# Patient Record
Sex: FEMALE | Race: BLACK OR AFRICAN AMERICAN | NOT HISPANIC OR LATINO | Employment: OTHER | URBAN - METROPOLITAN AREA
[De-identification: names, ages, dates, MRNs, and addresses within clinical notes are randomized per-mention and may not be internally consistent; named-entity substitution may affect disease eponyms.]

---

## 2023-03-02 PROBLEM — R59.0 CERVICAL LYMPHADENOPATHY: Status: ACTIVE | Noted: 2023-03-02

## 2023-03-02 PROBLEM — H93.13 SUBJECTIVE TINNITUS, BILATERAL: Status: ACTIVE | Noted: 2023-03-02

## 2023-03-02 PROBLEM — M85.80 OSTEOPENIA: Status: ACTIVE | Noted: 2023-03-02

## 2023-03-02 PROBLEM — H81.12 BENIGN PAROXYSMAL POSITIONAL VERTIGO OF LEFT EAR: Status: ACTIVE | Noted: 2023-03-02

## 2023-03-02 PROBLEM — M54.17 LUMBOSACRAL NEURITIS: Status: ACTIVE | Noted: 2023-03-02

## 2023-03-02 PROBLEM — R93.1 ELEVATED CORONARY ARTERY CALCIUM SCORE: Status: ACTIVE | Noted: 2023-03-02

## 2023-03-02 PROBLEM — E78.5 DYSLIPIDEMIA: Status: ACTIVE | Noted: 2023-03-02

## 2023-03-02 PROBLEM — M10.9 GOUT: Status: ACTIVE | Noted: 2023-03-02

## 2023-03-02 PROBLEM — D49.2 ATYPICAL SQUAMOPROLIFERATIVE SKIN LESION: Status: ACTIVE | Noted: 2023-03-02

## 2023-03-02 PROBLEM — I10 HYPERTENSION: Status: ACTIVE | Noted: 2023-03-02

## 2023-03-02 PROBLEM — N76.0 VAGINITIS AND VULVOVAGINITIS: Status: ACTIVE | Noted: 2023-03-02

## 2023-03-02 PROBLEM — N90.89 VULVAR SKIN TAG: Status: ACTIVE | Noted: 2023-03-02

## 2023-03-02 PROBLEM — M19.019 PRIMARY OSTEOARTHRITIS OF SHOULDER: Status: ACTIVE | Noted: 2023-03-02

## 2023-03-02 PROBLEM — R42 VERTIGO: Status: ACTIVE | Noted: 2023-03-02

## 2023-03-02 PROBLEM — D72.819 LEUKOPENIA: Status: ACTIVE | Noted: 2023-03-02

## 2023-03-02 PROBLEM — R17 ELEVATED BILIRUBIN: Status: ACTIVE | Noted: 2023-03-02

## 2023-03-02 PROBLEM — N95.2 POSTMENOPAUSAL ATROPHIC VAGINITIS: Status: ACTIVE | Noted: 2023-03-02

## 2023-03-02 PROBLEM — I35.1 MODERATE AORTIC INSUFFICIENCY: Status: ACTIVE | Noted: 2023-03-02

## 2023-03-02 RX ORDER — SIMVASTATIN 10 MG/1
10 TABLET, FILM COATED ORAL NIGHTLY
COMMUNITY
Start: 2015-04-28 | End: 2024-01-25 | Stop reason: SDUPTHER

## 2023-03-02 RX ORDER — ESTRADIOL 0.1 MG/G
CREAM VAGINAL
COMMUNITY
Start: 2022-03-21 | End: 2024-02-07 | Stop reason: SINTOL

## 2023-03-02 RX ORDER — ALENDRONATE SODIUM 70 MG/1
70 TABLET ORAL
COMMUNITY
Start: 2020-07-21 | End: 2024-02-07 | Stop reason: SINTOL

## 2023-03-02 RX ORDER — NYSTATIN 100000 U/G
CREAM TOPICAL
COMMUNITY
Start: 2022-03-13 | End: 2024-04-04 | Stop reason: WASHOUT

## 2023-03-02 RX ORDER — OMEPRAZOLE 20 MG/1
20 CAPSULE, DELAYED RELEASE ORAL EVERY MORNING
COMMUNITY
Start: 2017-04-03 | End: 2023-06-26 | Stop reason: SDUPTHER

## 2023-03-02 RX ORDER — AMLODIPINE BESYLATE 5 MG/1
1 TABLET ORAL DAILY
COMMUNITY
Start: 2019-04-18 | End: 2023-10-09 | Stop reason: SDUPTHER

## 2023-03-02 RX ORDER — BETAMETHASONE VALERATE 1 MG/G
CREAM TOPICAL
COMMUNITY
Start: 2022-05-31

## 2023-03-02 RX ORDER — LOSARTAN POTASSIUM 100 MG/1
1 TABLET ORAL DAILY
COMMUNITY
Start: 2014-02-14 | End: 2023-06-26 | Stop reason: SDUPTHER

## 2023-03-09 ENCOUNTER — OFFICE VISIT (OUTPATIENT)
Dept: PRIMARY CARE | Facility: CLINIC | Age: 83
End: 2023-03-09
Payer: MEDICARE

## 2023-03-09 VITALS
HEART RATE: 90 BPM | BODY MASS INDEX: 22.72 KG/M2 | DIASTOLIC BLOOD PRESSURE: 79 MMHG | SYSTOLIC BLOOD PRESSURE: 150 MMHG | WEIGHT: 105 LBS

## 2023-03-09 DIAGNOSIS — R10.13 EPIGASTRIC ABDOMINAL PAIN: ICD-10-CM

## 2023-03-09 DIAGNOSIS — I10 PRIMARY HYPERTENSION: ICD-10-CM

## 2023-03-09 DIAGNOSIS — M19.90 OSTEOARTHRITIS, UNSPECIFIED OSTEOARTHRITIS TYPE, UNSPECIFIED SITE: Primary | ICD-10-CM

## 2023-03-09 PROCEDURE — 1159F MED LIST DOCD IN RCRD: CPT | Performed by: INTERNAL MEDICINE

## 2023-03-09 PROCEDURE — 1160F RVW MEDS BY RX/DR IN RCRD: CPT | Performed by: INTERNAL MEDICINE

## 2023-03-09 PROCEDURE — 99214 OFFICE O/P EST MOD 30 MIN: CPT | Performed by: INTERNAL MEDICINE

## 2023-03-09 PROCEDURE — 3078F DIAST BP <80 MM HG: CPT | Performed by: INTERNAL MEDICINE

## 2023-03-09 PROCEDURE — 3077F SYST BP >= 140 MM HG: CPT | Performed by: INTERNAL MEDICINE

## 2023-03-09 ASSESSMENT — ENCOUNTER SYMPTOMS
APPETITE CHANGE: 0
WHEEZING: 0
UNEXPECTED WEIGHT CHANGE: 0
CONSTIPATION: 0
SINUS PRESSURE: 0
COUGH: 0
PALPITATIONS: 0
CHEST TIGHTNESS: 0
DIARRHEA: 0
FEVER: 0
NAUSEA: 0
CONFUSION: 0
SORE THROAT: 0
VOMITING: 0
SLEEP DISTURBANCE: 0
SHORTNESS OF BREATH: 0
ABDOMINAL PAIN: 1

## 2023-03-09 NOTE — PROGRESS NOTES
Desiree Cochran comes in today for an ER follow up.    Ms. Cochran comes in today for an ER follow-up.  About 2 weeks ago, she was traveling, ate some fried fish, and the following morning woke up with epigastric pain, palpitations, and a headache.  She returned home, then went to the emergency room.  Any cardiac event was ruled out.  She has felt essentially back to her baseline.  She still has occasional symptoms intermittently of reflux, she does use Mylanta for this.  This is rare.  Her appetite is essentially back to normal.  Blood pressure at home has been excellent, typically in the 120's/70's.  She feels well, but wanted to check in with us as a follow up.  She will be returning for her routine wellness visit in the late spring.      ER Follow-up  Associated symptoms include abdominal pain. Pertinent negatives include no chest pain, coughing, fever, nausea, sore throat or vomiting.       Review of Systems   Constitutional:  Negative for appetite change, fever and unexpected weight change.   HENT:  Negative for sinus pressure and sore throat.    Respiratory:  Negative for cough, chest tightness, shortness of breath and wheezing.    Cardiovascular:  Negative for chest pain, palpitations and leg swelling.   Gastrointestinal:  Positive for abdominal pain. Negative for constipation, diarrhea, nausea and vomiting.   Psychiatric/Behavioral:  Negative for confusion and sleep disturbance.        Objective   Physical Exam  Constitutional:       Appearance: Normal appearance.   Cardiovascular:      Rate and Rhythm: Normal rate and regular rhythm.      Heart sounds: No murmur heard.  Pulmonary:      Effort: Pulmonary effort is normal. No respiratory distress.      Breath sounds: Normal breath sounds. No rhonchi or rales.   Abdominal:      General: Abdomen is flat. Bowel sounds are normal. There is no distension.      Tenderness: There is no abdominal tenderness. There is no guarding.   Neurological:      Mental Status: She  is alert.         Assessment/Plan   1.  Epigastric discomfort: Has been managing well, symptoms fully resolved.  No change in medications.  Encouraged to continue on Prilosec daily.  Contact us if refills needed  2.  Hypertension: Well managed at home.  Tends to be elevated here in office, but running in excellent ranges at home.  Contact us if refills needed.  3.  Osteoarthritis: Requesting disability placard prescription.  Provided.    Happy to see her back in the spring for her wellness visit.  She is to contact us with any questions.  Problem List Items Addressed This Visit    None  Visit Diagnoses       Osteoarthritis, unspecified osteoarthritis type, unspecified site    -  Primary    Relevant Orders    Disability Placard

## 2023-03-09 NOTE — PATIENT INSTRUCTIONS
I am happy to hear that your symptoms have resolved.  I would recommend continuing on Prilosec daily, taken 30 minutes prior to breakfast.  If you have any recurrence of symptoms or worsening symptoms, please contact us.  Otherwise, we are happy to see you back in the spring for your wellness visit.  Please call with any questions or concerns.

## 2023-03-31 ENCOUNTER — TELEPHONE (OUTPATIENT)
Dept: PRIMARY CARE | Facility: CLINIC | Age: 83
End: 2023-03-31

## 2023-04-12 ENCOUNTER — OFFICE VISIT (OUTPATIENT)
Dept: PRIMARY CARE | Facility: CLINIC | Age: 83
End: 2023-04-12
Payer: MEDICARE

## 2023-04-12 VITALS
WEIGHT: 105.4 LBS | SYSTOLIC BLOOD PRESSURE: 126 MMHG | BODY MASS INDEX: 22.81 KG/M2 | HEART RATE: 62 BPM | DIASTOLIC BLOOD PRESSURE: 72 MMHG

## 2023-04-12 DIAGNOSIS — E78.5 DYSLIPIDEMIA: ICD-10-CM

## 2023-04-12 DIAGNOSIS — M85.80 OSTEOPENIA, UNSPECIFIED LOCATION: ICD-10-CM

## 2023-04-12 DIAGNOSIS — F43.21 GRIEF REACTION: ICD-10-CM

## 2023-04-12 DIAGNOSIS — R10.13 EPIGASTRIC ABDOMINAL PAIN: Primary | ICD-10-CM

## 2023-04-12 DIAGNOSIS — R19.7 DIARRHEA, UNSPECIFIED TYPE: ICD-10-CM

## 2023-04-12 PROCEDURE — 99214 OFFICE O/P EST MOD 30 MIN: CPT | Performed by: INTERNAL MEDICINE

## 2023-04-12 PROCEDURE — 3074F SYST BP LT 130 MM HG: CPT | Performed by: INTERNAL MEDICINE

## 2023-04-12 PROCEDURE — 3078F DIAST BP <80 MM HG: CPT | Performed by: INTERNAL MEDICINE

## 2023-04-12 PROCEDURE — 1159F MED LIST DOCD IN RCRD: CPT | Performed by: INTERNAL MEDICINE

## 2023-04-12 PROCEDURE — 1160F RVW MEDS BY RX/DR IN RCRD: CPT | Performed by: INTERNAL MEDICINE

## 2023-04-12 PROCEDURE — 1036F TOBACCO NON-USER: CPT | Performed by: INTERNAL MEDICINE

## 2023-04-12 RX ORDER — LORAZEPAM 0.5 MG/1
TABLET ORAL
COMMUNITY
Start: 2023-03-30

## 2023-04-12 RX ORDER — METOPROLOL TARTRATE 25 MG/1
0.5 TABLET, FILM COATED ORAL 2 TIMES DAILY
COMMUNITY
Start: 2022-09-02 | End: 2023-08-01 | Stop reason: SINTOL

## 2023-04-12 RX ORDER — CLOBETASOL PROPIONATE 0.5 MG/G
OINTMENT TOPICAL
COMMUNITY
Start: 2022-09-20 | End: 2024-02-07 | Stop reason: SDUPTHER

## 2023-04-12 ASSESSMENT — ENCOUNTER SYMPTOMS
DIARRHEA: 1
NAUSEA: 0
APPETITE CHANGE: 0
VOMITING: 0
COUGH: 0
ABDOMINAL PAIN: 0
DIZZINESS: 0
CHEST TIGHTNESS: 0
HEADACHES: 0
CONSTIPATION: 0
WHEEZING: 0
ABDOMINAL DISTENTION: 0
SHORTNESS OF BREATH: 0
ACTIVITY CHANGE: 0
RECTAL PAIN: 0
FATIGUE: 0
BLOOD IN STOOL: 0
WEAKNESS: 0
UNEXPECTED WEIGHT CHANGE: 1
FEVER: 0

## 2023-04-12 NOTE — PROGRESS NOTES
Desiree Cochran comes in today for an ER follow up.      Ms. Cochran returns today with another ER visit.  She went back for diarrhea.  She was having epigastric discomfort again.  She continues on PPI therapy.  Lab work was all reassuring.  CT showed a mild gastritis, but otherwise reassuring.  She was asked to schedule an EGD.  She admits that some of her symptoms may be related to stress as she has been grieving her sister's death recently.  She has started on Mylanta and Imodium and actually feels significantly better.  She has dropped some weight overall, however, but really on comparison with her chart, this has been only about 5 pounds in the past year.  She states that her appetite is normal, but she is afraid to eat because of the diarrhea.  She did stop her alendronate as well and feels that this is improving her symptoms somewhat.  She also stopped her statin in case this was contributing.  The longer out that she is from her sister's passing, and the more she is adjusting to her grief, her symptoms seem to be improving.  She is not particularly interested in invasive procedures, but is comfortable at least proceeding with that referral.  There is no abdominal pain at this time and the diarrhea is improving, with more formed stools.  She has had no nausea nor vomiting.        Review of Systems   Constitutional:  Positive for unexpected weight change (Slight weight drop, about 5 lbs over past year). Negative for activity change, appetite change, fatigue and fever.   Respiratory:  Negative for cough, chest tightness, shortness of breath and wheezing.    Cardiovascular:  Negative for chest pain.   Gastrointestinal:  Positive for diarrhea. Negative for abdominal distention, abdominal pain, blood in stool, constipation, nausea, rectal pain and vomiting.   Neurological:  Negative for dizziness, syncope, weakness and headaches.       Objective   Physical Exam  Cardiovascular:      Rate and Rhythm: Normal rate and  regular rhythm.   Abdominal:      General: Abdomen is flat. Bowel sounds are normal. There is no distension.      Palpations: Abdomen is soft.      Tenderness: There is no abdominal tenderness. There is no guarding or rebound.      Hernia: No hernia is present.   Neurological:      Mental Status: She is alert.         Assessment/Plan   1.  Epigastric pain with history of PUD: Continues on PPI therapy.  Lab work really looked quite reassuring with recent ER visit.  Have discussed endoscopy versus GI referral.  We will start with GI referral especially as symptoms starting to improve and lab work was reassuring.  However, if she is not able to get in with them for some time, we would proceed with endoscopy.  She is to contact us with an update especially if symptoms take a turn for the worse.  2.  Grief reaction: The anxiety and grief she admits likely is contributing significantly.  She is trying to manage this well and is talking with family, starting to get a bit more comfortable.  3.  Osteopenia: She has stopped her alendronate therapy, and feels as though this was exacerbating her symptoms.  Reasonable to remain off of this.  4.  Hyperlipidemia: Again, has stopped statin.  Although not ideal, reasonable until symptoms improve.    If she has any recurrence or worsening of symptoms again, she is to contact us promptly.  Problem List Items Addressed This Visit    None

## 2023-04-12 NOTE — PATIENT INSTRUCTIONS
A referral has been placed for a gastroenterologist.  If it does take quite some time to get in for this appointment, we could consider directly proceeding with an endoscopy.  Please keep us updated on when you have this scheduled.  If you have any acute worsening of symptoms, please contact us immediately.

## 2023-06-26 DIAGNOSIS — I10 PRIMARY HYPERTENSION: ICD-10-CM

## 2023-06-26 DIAGNOSIS — R10.13 EPIGASTRIC ABDOMINAL PAIN: ICD-10-CM

## 2023-06-26 RX ORDER — OMEPRAZOLE 20 MG/1
20 CAPSULE, DELAYED RELEASE ORAL EVERY MORNING
Qty: 90 CAPSULE | Refills: 1 | Status: SHIPPED | OUTPATIENT
Start: 2023-06-26 | End: 2024-02-14 | Stop reason: SDUPTHER

## 2023-06-26 RX ORDER — LOSARTAN POTASSIUM 100 MG/1
100 TABLET ORAL DAILY
Qty: 90 TABLET | Refills: 1 | Status: SHIPPED | OUTPATIENT
Start: 2023-06-26 | End: 2024-02-14 | Stop reason: SDUPTHER

## 2023-08-01 ENCOUNTER — APPOINTMENT (OUTPATIENT)
Dept: LAB | Facility: LAB | Age: 83
End: 2023-08-01
Payer: MEDICARE

## 2023-08-01 ENCOUNTER — OFFICE VISIT (OUTPATIENT)
Dept: PRIMARY CARE | Facility: CLINIC | Age: 83
End: 2023-08-01
Payer: MEDICARE

## 2023-08-01 ENCOUNTER — LAB (OUTPATIENT)
Dept: LAB | Facility: LAB | Age: 83
End: 2023-08-01
Payer: MEDICARE

## 2023-08-01 VITALS
HEIGHT: 57 IN | HEART RATE: 80 BPM | BODY MASS INDEX: 22.18 KG/M2 | SYSTOLIC BLOOD PRESSURE: 149 MMHG | WEIGHT: 102.8 LBS | DIASTOLIC BLOOD PRESSURE: 77 MMHG

## 2023-08-01 DIAGNOSIS — D72.819 LEUKOPENIA, UNSPECIFIED TYPE: ICD-10-CM

## 2023-08-01 DIAGNOSIS — I10 PRIMARY HYPERTENSION: ICD-10-CM

## 2023-08-01 DIAGNOSIS — Z12.31 ENCOUNTER FOR SCREENING MAMMOGRAM FOR MALIGNANT NEOPLASM OF BREAST: ICD-10-CM

## 2023-08-01 DIAGNOSIS — E78.5 DYSLIPIDEMIA: ICD-10-CM

## 2023-08-01 DIAGNOSIS — M54.17 LUMBOSACRAL NEURITIS: ICD-10-CM

## 2023-08-01 DIAGNOSIS — M10.9 GOUT, UNSPECIFIED CAUSE, UNSPECIFIED CHRONICITY, UNSPECIFIED SITE: ICD-10-CM

## 2023-08-01 DIAGNOSIS — M85.80 OSTEOPENIA, UNSPECIFIED LOCATION: ICD-10-CM

## 2023-08-01 DIAGNOSIS — H91.90 HEARING LOSS, UNSPECIFIED HEARING LOSS TYPE, UNSPECIFIED LATERALITY: ICD-10-CM

## 2023-08-01 DIAGNOSIS — Z00.00 ROUTINE GENERAL MEDICAL EXAMINATION AT HEALTH CARE FACILITY: Primary | ICD-10-CM

## 2023-08-01 DIAGNOSIS — H93.13 SUBJECTIVE TINNITUS, BILATERAL: ICD-10-CM

## 2023-08-01 LAB
ALANINE AMINOTRANSFERASE (SGPT) (U/L) IN SER/PLAS: 20 U/L (ref 7–45)
ALBUMIN (G/DL) IN SER/PLAS: 4.7 G/DL (ref 3.4–5)
ALBUMIN (MG/L) IN URINE: <7 MG/L
ALBUMIN/CREATININE (UG/MG) IN URINE: NORMAL UG/MG CRT (ref 0–30)
ALKALINE PHOSPHATASE (U/L) IN SER/PLAS: 84 U/L (ref 33–136)
ANION GAP IN SER/PLAS: 12 MMOL/L (ref 10–20)
APPEARANCE, URINE: CLEAR
ASPARTATE AMINOTRANSFERASE (SGOT) (U/L) IN SER/PLAS: 24 U/L (ref 9–39)
BASOPHILS (10*3/UL) IN BLOOD BY AUTOMATED COUNT: 0.02 X10E9/L (ref 0–0.1)
BASOPHILS/100 LEUKOCYTES IN BLOOD BY AUTOMATED COUNT: 0.5 % (ref 0–2)
BILIRUBIN TOTAL (MG/DL) IN SER/PLAS: 1.1 MG/DL (ref 0–1.2)
BILIRUBIN, URINE: NEGATIVE
BLOOD, URINE: NEGATIVE
CALCIDIOL (25 OH VITAMIN D3) (NG/ML) IN SER/PLAS: 50 NG/ML
CALCIUM (MG/DL) IN SER/PLAS: 9.9 MG/DL (ref 8.6–10.6)
CARBON DIOXIDE, TOTAL (MMOL/L) IN SER/PLAS: 28 MMOL/L (ref 21–32)
CHLORIDE (MMOL/L) IN SER/PLAS: 100 MMOL/L (ref 98–107)
CHOLESTEROL (MG/DL) IN SER/PLAS: 206 MG/DL (ref 0–199)
CHOLESTEROL IN HDL (MG/DL) IN SER/PLAS: 65.3 MG/DL
CHOLESTEROL/HDL RATIO: 3.2
COBALAMIN (VITAMIN B12) (PG/ML) IN SER/PLAS: 1018 PG/ML (ref 211–911)
COLOR, URINE: NORMAL
CREATINE KINASE (U/L) IN SER/PLAS: 81 U/L (ref 0–215)
CREATININE (MG/DL) IN SER/PLAS: 1.01 MG/DL (ref 0.5–1.05)
CREATININE (MG/DL) IN URINE: 36.9 MG/DL (ref 20–320)
EOSINOPHILS (10*3/UL) IN BLOOD BY AUTOMATED COUNT: 0.04 X10E9/L (ref 0–0.4)
EOSINOPHILS/100 LEUKOCYTES IN BLOOD BY AUTOMATED COUNT: 1.1 % (ref 0–6)
ERYTHROCYTE DISTRIBUTION WIDTH (RATIO) BY AUTOMATED COUNT: 13.5 % (ref 11.5–14.5)
ERYTHROCYTE MEAN CORPUSCULAR HEMOGLOBIN CONCENTRATION (G/DL) BY AUTOMATED: 33.4 G/DL (ref 32–36)
ERYTHROCYTE MEAN CORPUSCULAR VOLUME (FL) BY AUTOMATED COUNT: 92 FL (ref 80–100)
ERYTHROCYTES (10*6/UL) IN BLOOD BY AUTOMATED COUNT: 4.47 X10E12/L (ref 4–5.2)
GFR FEMALE: 56 ML/MIN/1.73M2
GLUCOSE (MG/DL) IN SER/PLAS: 89 MG/DL (ref 74–99)
GLUCOSE, URINE: NEGATIVE MG/DL
HEMATOCRIT (%) IN BLOOD BY AUTOMATED COUNT: 41.3 % (ref 36–46)
HEMOGLOBIN (G/DL) IN BLOOD: 13.8 G/DL (ref 12–16)
IMMATURE GRANULOCYTES/100 LEUKOCYTES IN BLOOD BY AUTOMATED COUNT: 0.3 % (ref 0–0.9)
IRON (UG/DL) IN SER/PLAS: 123 UG/DL (ref 35–150)
IRON BINDING CAPACITY (UG/DL) IN SER/PLAS: 374 UG/DL (ref 240–445)
IRON SATURATION (%) IN SER/PLAS: 33 % (ref 25–45)
KETONES, URINE: NEGATIVE MG/DL
LDL: 125 MG/DL (ref 0–99)
LEUKOCYTE ESTERASE, URINE: NEGATIVE
LEUKOCYTES (10*3/UL) IN BLOOD BY AUTOMATED COUNT: 3.7 X10E9/L (ref 4.4–11.3)
LYMPHOCYTES (10*3/UL) IN BLOOD BY AUTOMATED COUNT: 0.87 X10E9/L (ref 0.8–3)
LYMPHOCYTES/100 LEUKOCYTES IN BLOOD BY AUTOMATED COUNT: 23.7 % (ref 13–44)
MONOCYTES (10*3/UL) IN BLOOD BY AUTOMATED COUNT: 0.34 X10E9/L (ref 0.05–0.8)
MONOCYTES/100 LEUKOCYTES IN BLOOD BY AUTOMATED COUNT: 9.3 % (ref 2–10)
NEUTROPHILS (10*3/UL) IN BLOOD BY AUTOMATED COUNT: 2.39 X10E9/L (ref 1.6–5.5)
NEUTROPHILS/100 LEUKOCYTES IN BLOOD BY AUTOMATED COUNT: 65.1 % (ref 40–80)
NITRITE, URINE: NEGATIVE
NRBC (PER 100 WBCS) BY AUTOMATED COUNT: 0 /100 WBC (ref 0–0)
PH, URINE: 7 (ref 5–8)
PLATELETS (10*3/UL) IN BLOOD AUTOMATED COUNT: 266 X10E9/L (ref 150–450)
POTASSIUM (MMOL/L) IN SER/PLAS: 4.3 MMOL/L (ref 3.5–5.3)
PROTEIN TOTAL: 7.6 G/DL (ref 6.4–8.2)
PROTEIN, URINE: NEGATIVE MG/DL
SODIUM (MMOL/L) IN SER/PLAS: 136 MMOL/L (ref 136–145)
SPECIFIC GRAVITY, URINE: 1.01 (ref 1–1.03)
THYROTROPIN (MIU/L) IN SER/PLAS BY DETECTION LIMIT <= 0.05 MIU/L: 1.41 MIU/L (ref 0.44–3.98)
TRIGLYCERIDE (MG/DL) IN SER/PLAS: 77 MG/DL (ref 0–149)
URATE (MG/DL) IN SER/PLAS: 4.6 MG/DL (ref 2.3–6.7)
UREA NITROGEN (MG/DL) IN SER/PLAS: 22 MG/DL (ref 6–23)
UROBILINOGEN, URINE: <2 MG/DL (ref 0–1.9)
VLDL: 15 MG/DL (ref 0–40)

## 2023-08-01 PROCEDURE — 80053 COMPREHEN METABOLIC PANEL: CPT

## 2023-08-01 PROCEDURE — 80061 LIPID PANEL: CPT

## 2023-08-01 PROCEDURE — 3077F SYST BP >= 140 MM HG: CPT | Performed by: INTERNAL MEDICINE

## 2023-08-01 PROCEDURE — 36415 COLL VENOUS BLD VENIPUNCTURE: CPT

## 2023-08-01 PROCEDURE — 82306 VITAMIN D 25 HYDROXY: CPT

## 2023-08-01 PROCEDURE — 82570 ASSAY OF URINE CREATININE: CPT

## 2023-08-01 PROCEDURE — 83550 IRON BINDING TEST: CPT

## 2023-08-01 PROCEDURE — 82607 VITAMIN B-12: CPT

## 2023-08-01 PROCEDURE — 1036F TOBACCO NON-USER: CPT | Performed by: INTERNAL MEDICINE

## 2023-08-01 PROCEDURE — 84443 ASSAY THYROID STIM HORMONE: CPT

## 2023-08-01 PROCEDURE — 99213 OFFICE O/P EST LOW 20 MIN: CPT | Performed by: INTERNAL MEDICINE

## 2023-08-01 PROCEDURE — 85025 COMPLETE CBC W/AUTO DIFF WBC: CPT

## 2023-08-01 PROCEDURE — 1159F MED LIST DOCD IN RCRD: CPT | Performed by: INTERNAL MEDICINE

## 2023-08-01 PROCEDURE — 82043 UR ALBUMIN QUANTITATIVE: CPT

## 2023-08-01 PROCEDURE — 84550 ASSAY OF BLOOD/URIC ACID: CPT

## 2023-08-01 PROCEDURE — 83540 ASSAY OF IRON: CPT

## 2023-08-01 PROCEDURE — G0439 PPPS, SUBSEQ VISIT: HCPCS | Performed by: INTERNAL MEDICINE

## 2023-08-01 PROCEDURE — 3078F DIAST BP <80 MM HG: CPT | Performed by: INTERNAL MEDICINE

## 2023-08-01 PROCEDURE — 81003 URINALYSIS AUTO W/O SCOPE: CPT

## 2023-08-01 PROCEDURE — 1170F FXNL STATUS ASSESSED: CPT | Performed by: INTERNAL MEDICINE

## 2023-08-01 PROCEDURE — 82550 ASSAY OF CK (CPK): CPT

## 2023-08-01 PROCEDURE — 93000 ELECTROCARDIOGRAM COMPLETE: CPT | Performed by: INTERNAL MEDICINE

## 2023-08-01 PROCEDURE — 1125F AMNT PAIN NOTED PAIN PRSNT: CPT | Performed by: INTERNAL MEDICINE

## 2023-08-01 PROCEDURE — 1160F RVW MEDS BY RX/DR IN RCRD: CPT | Performed by: INTERNAL MEDICINE

## 2023-08-01 PROCEDURE — 99397 PER PM REEVAL EST PAT 65+ YR: CPT | Performed by: INTERNAL MEDICINE

## 2023-08-01 ASSESSMENT — ENCOUNTER SYMPTOMS
UNEXPECTED WEIGHT CHANGE: 0
FLANK PAIN: 0
DEPRESSION: 0
SORE THROAT: 0
DIZZINESS: 0
COLOR CHANGE: 0
WEAKNESS: 0
SHORTNESS OF BREATH: 0
TROUBLE SWALLOWING: 0
APPETITE CHANGE: 0
FATIGUE: 0
HEMATURIA: 0
NECK STIFFNESS: 1
NAUSEA: 0
SINUS PAIN: 0
VOMITING: 0
HEADACHES: 0
CHEST TIGHTNESS: 0
WHEEZING: 0
ACTIVITY CHANGE: 0
RHINORRHEA: 0
DIARRHEA: 0
MYALGIAS: 1
ARTHRALGIAS: 1
SINUS PRESSURE: 0
LIGHT-HEADEDNESS: 0
COUGH: 0
PALPITATIONS: 0
ABDOMINAL DISTENTION: 0
ABDOMINAL PAIN: 0
ADENOPATHY: 0
VOICE CHANGE: 0
DYSPHORIC MOOD: 0
OCCASIONAL FEELINGS OF UNSTEADINESS: 0
BACK PAIN: 1
BRUISES/BLEEDS EASILY: 0
FEVER: 0
FREQUENCY: 1
NERVOUS/ANXIOUS: 0
DYSURIA: 0
SLEEP DISTURBANCE: 0
EYE ITCHING: 0
LOSS OF SENSATION IN FEET: 0
CONSTIPATION: 0
DECREASED CONCENTRATION: 0

## 2023-08-01 ASSESSMENT — ACTIVITIES OF DAILY LIVING (ADL)
TAKING_MEDICATION: INDEPENDENT
MANAGING_FINANCES: INDEPENDENT
DOING_HOUSEWORK: INDEPENDENT
GROCERY_SHOPPING: INDEPENDENT
DRESSING: INDEPENDENT
BATHING: INDEPENDENT

## 2023-08-01 ASSESSMENT — PATIENT HEALTH QUESTIONNAIRE - PHQ9
1. LITTLE INTEREST OR PLEASURE IN DOING THINGS: NOT AT ALL
2. FEELING DOWN, DEPRESSED OR HOPELESS: NOT AT ALL
SUM OF ALL RESPONSES TO PHQ9 QUESTIONS 1 AND 2: 0

## 2023-08-01 NOTE — PROGRESS NOTES
Subjective   Reason for Visit: Desiree Cochran is an 82 y.o. female patient comes in today for comprehensive physical and follow-up of medical conditions in conjunction with annual wellness visit    Ms. Cochran comes in today for comprehensive physical and follow-up of medical conditions in conjunction with annual wellness visit, dictated in a separate note.  Please refer to that note for details on healthcare maintenance and screening studies.    Ms. Cochran comes in today for comprehensive physical as well as a follow-up of medical conditions.  Her abdominal symptoms have essentially resolved and she is feeling better.  Her weight has stabilized.  She is trying to eat a bland diet and finds that this does quite well for her.  She denies any headaches, chest pain or palpitations.  She has noticed a decrease in hearing on her left.  She has been battling some right-sided sciatic pain and would like physical therapy.  She continues on her amlodipine and losartan, tolerating these well.  Blood pressures at home are consistently in the 110s/60s.  She has been having some urinary frequency and would like to have this evaluated.  She otherwise feels reasonably well.  She is amenable to having some updated blood work.        Patient Care Team:  Betsy Schneider MD as PCP - General  Betsy Schneider MD as PCP - [employer] PCP     Review of Systems   Constitutional:  Negative for activity change, appetite change, fatigue, fever and unexpected weight change.   HENT:  Positive for hearing loss. Negative for congestion, ear pain, postnasal drip, rhinorrhea, sinus pressure, sinus pain, sore throat, trouble swallowing and voice change.    Eyes:  Negative for itching and visual disturbance.   Respiratory:  Negative for cough, chest tightness, shortness of breath and wheezing.    Cardiovascular:  Negative for chest pain, palpitations and leg swelling.   Gastrointestinal:  Negative for abdominal distention, abdominal pain,  constipation, diarrhea, nausea and vomiting.   Endocrine: Negative for cold intolerance and polyuria.   Genitourinary:  Positive for frequency. Negative for dysuria, flank pain, hematuria, urgency and vaginal discharge.   Musculoskeletal:  Positive for arthralgias, back pain, myalgias and neck stiffness.   Skin:  Negative for color change, pallor and rash.   Allergic/Immunologic: Negative for environmental allergies, food allergies and immunocompromised state.   Neurological:  Negative for dizziness, syncope, weakness, light-headedness and headaches.   Hematological:  Negative for adenopathy. Does not bruise/bleed easily.   Psychiatric/Behavioral:  Negative for behavioral problems, decreased concentration, dysphoric mood and sleep disturbance. The patient is not nervous/anxious.        Objective   Vitals:  There were no vitals taken for this visit.      Physical Exam  Constitutional:       General: She is not in acute distress.     Appearance: Normal appearance. She is well-developed. She is not ill-appearing.   HENT:      Head: Normocephalic.      Right Ear: Ear canal and external ear normal. A middle ear effusion is present.      Left Ear: Ear canal and external ear normal. A middle ear effusion is present.      Nose: Nose normal.      Mouth/Throat:      Mouth: Mucous membranes are moist.      Pharynx: Oropharynx is clear. No oropharyngeal exudate or posterior oropharyngeal erythema.   Eyes:      General: Lids are normal. No scleral icterus.     Extraocular Movements: Extraocular movements intact.      Conjunctiva/sclera: Conjunctivae normal.      Pupils: Pupils are equal, round, and reactive to light.   Neck:      Vascular: No carotid bruit.   Cardiovascular:      Rate and Rhythm: Normal rate and regular rhythm.      Pulses: Normal pulses.      Heart sounds: No murmur heard.  Pulmonary:      Effort: Pulmonary effort is normal. No respiratory distress.      Breath sounds: No wheezing, rhonchi or rales.   Chest:       Comments: Significant fibrocystic changes bilaterally  Abdominal:      General: Bowel sounds are normal. There is no distension.      Palpations: Abdomen is soft. There is no mass.      Tenderness: There is no abdominal tenderness. There is no guarding.   Genitourinary:     Comments: Deferred as status post LUZ/BSO  Musculoskeletal:      Cervical back: Normal range of motion and neck supple. No tenderness.      Right lower leg: No edema.      Left lower leg: No edema.   Lymphadenopathy:      Cervical: No cervical adenopathy.      Upper Body:      Right upper body: No supraclavicular or axillary adenopathy.      Left upper body: No supraclavicular or axillary adenopathy.   Skin:     General: Skin is warm and dry.      Coloration: Skin is not pale.      Findings: No bruising or rash.   Neurological:      General: No focal deficit present.      Mental Status: She is alert and oriented to person, place, and time.      Cranial Nerves: No cranial nerve deficit.      Motor: No weakness.      Gait: Gait normal.   Psychiatric:         Mood and Affect: Mood normal.         Behavior: Behavior normal.         Judgment: Judgment normal.         Assessment/Plan     Full age-appropriate comprehensive physical exam and health care maintenance performed and discussed today.  Routine safety and preventative measures discussed including self breast exam, seatbelt use, no drinking and driving, no texting and driving, abstinence or cessation of tobacco use, routine dental and vision exams, healthy diet and regular exercise.    We will update comprehensive labs and follow-up on results once available.  She would like to continue with routine annual mammograms, requisition placed.  DEXA up-to-date from 2022, mild osteopenia, now off bisphosphonate therapy, repeat next year.  No indication for repeat colonoscopy screening at this time.  EKG updated, normal sinus rhythm, no acute ST segment abnormalities.  Tetanus up-to-date from 2014.   Has completed pneumonia vaccine series.,  Declines flu shot, COVID boosters, nor shingles vaccine series.    In addition to the above-mentioned healthcare maintenance and screening studies, the following were discussed and assessed in detail today:  1.  Dyslipidemia: Due for updated labs.  Follow-up on results once available.  2.  Hypertension: Elevated today but running in excellent ranges at home.  Remains off metoprolol as well.  Continue amlodipine, tolerating losartan well, despite questionable history of angioedema with ACE inhibitor's in the past.  Has been on this for several years.  3.  Leukopenia: Update CBC.  Follow routinely.  4.  History of gout: Continue following uric acid levels.  Currently asymptomatic.  5.  Subjective hearing loss: Referral to audiology placed.  6.  Lumbosacral neuritis with sciatic pain: Referral to physical therapy.  7.  Osteopenia: Again, last DEXA looked reasonable.  Update vitamin D levels.  Has discontinued bisphosphonate therapy secondary to GI side effects.    Happy to see her back in 6 months for follow-up.  She is to call with any questions.    Problem List Items Addressed This Visit    None  Visit Diagnoses       Routine general medical examination at health care facility    -  Primary

## 2023-08-01 NOTE — PATIENT INSTRUCTIONS
I am glad to hear that you are feeling better.  We will follow up on all comprehensive blood work once results are available and make any recommendations based on these results as may be indicated.  We will continue with routine mammograms.  Bone density scan will be due next year as well.  Referrals have been placed for hearing testing as well as physical therapy.  If you have any questions, or if you should need any additional refills, please feel free to contact us.  Otherwise, we are happy to see you back in 6 months for brief follow-up.

## 2023-08-30 ENCOUNTER — TELEPHONE (OUTPATIENT)
Dept: PRIMARY CARE | Facility: CLINIC | Age: 83
End: 2023-08-30

## 2023-08-30 DIAGNOSIS — I35.1 MODERATE AORTIC INSUFFICIENCY: Primary | ICD-10-CM

## 2023-10-08 DIAGNOSIS — E78.5 HYPERLIPIDEMIA, UNSPECIFIED: ICD-10-CM

## 2023-10-09 DIAGNOSIS — I10 PRIMARY HYPERTENSION: Primary | ICD-10-CM

## 2023-10-09 RX ORDER — SIMVASTATIN 10 MG/1
10 TABLET, FILM COATED ORAL NIGHTLY
Qty: 90 TABLET | Refills: 3 | OUTPATIENT
Start: 2023-10-09

## 2023-10-09 RX ORDER — AMLODIPINE BESYLATE 5 MG/1
5 TABLET ORAL DAILY
Qty: 90 TABLET | Refills: 1 | Status: SHIPPED | OUTPATIENT
Start: 2023-10-09 | End: 2024-05-29 | Stop reason: SDUPTHER

## 2023-10-26 ENCOUNTER — OFFICE VISIT (OUTPATIENT)
Dept: OTOLARYNGOLOGY | Facility: CLINIC | Age: 83
End: 2023-10-26
Payer: MEDICARE

## 2023-10-26 VITALS — TEMPERATURE: 96.7 F | HEIGHT: 57 IN | WEIGHT: 105 LBS | BODY MASS INDEX: 22.65 KG/M2

## 2023-10-26 DIAGNOSIS — H90.3 SENSORINEURAL HEARING LOSS (SNHL) OF BOTH EARS: ICD-10-CM

## 2023-10-26 DIAGNOSIS — R09.82 POST-NASAL DRAINAGE: ICD-10-CM

## 2023-10-26 DIAGNOSIS — H69.92 DYSFUNCTION OF LEFT EUSTACHIAN TUBE: Primary | ICD-10-CM

## 2023-10-26 DIAGNOSIS — H92.02 LEFT EAR PAIN: ICD-10-CM

## 2023-10-26 DIAGNOSIS — J34.3 HYPERTROPHY OF NASAL TURBINATES: ICD-10-CM

## 2023-10-26 PROCEDURE — 1160F RVW MEDS BY RX/DR IN RCRD: CPT | Performed by: NURSE PRACTITIONER

## 2023-10-26 PROCEDURE — 99204 OFFICE O/P NEW MOD 45 MIN: CPT | Performed by: NURSE PRACTITIONER

## 2023-10-26 PROCEDURE — 1159F MED LIST DOCD IN RCRD: CPT | Performed by: NURSE PRACTITIONER

## 2023-10-26 PROCEDURE — 1125F AMNT PAIN NOTED PAIN PRSNT: CPT | Performed by: NURSE PRACTITIONER

## 2023-10-26 PROCEDURE — 31231 NASAL ENDOSCOPY DX: CPT | Performed by: NURSE PRACTITIONER

## 2023-10-26 PROCEDURE — 1036F TOBACCO NON-USER: CPT | Performed by: NURSE PRACTITIONER

## 2023-10-26 RX ORDER — OLOPATADINE HYDROCHLORIDE AND MOMETASONE FUROATE 25; 665 UG/1; UG/1
2 SPRAY, METERED NASAL DAILY
Qty: 29 G | Refills: 1 | Status: SHIPPED | OUTPATIENT
Start: 2023-10-26 | End: 2023-12-25

## 2023-10-26 ASSESSMENT — PATIENT HEALTH QUESTIONNAIRE - PHQ9
2. FEELING DOWN, DEPRESSED OR HOPELESS: NOT AT ALL
1. LITTLE INTEREST OR PLEASURE IN DOING THINGS: NOT AT ALL
SUM OF ALL RESPONSES TO PHQ9 QUESTIONS 1 AND 2: 0

## 2023-10-26 NOTE — PROGRESS NOTES
Subjective   Patient ID: Desiree Cochran is a 82 y.o. female who presents for Hearing Loss.  HPI  Patient reports that she has been experiencing left ear pain and periodic blocked ear sensation. No drainage from the  ear canal and no associated dizziness, tinnitus or vertigo.  The ear symptoms have been consistent.  She denies teeth clenching or grinding and no associated headaches. She has not tried any medications.  Unsure if she truly has  seasonal allergies but she endorses post nasal drainage and nasal congestion.    She also states that she has no history of chronic ear infections, ear surgery or trauma to the ears.  She is able to hear conversations although her hearing has declined over the past few years.     Audiogram findings from 9/23/2023 showed bilateral SNHL mild to moderate higher frequencies. Absent reflex right ear with normal tympanogram. Acoustic reflex left ear absent at 4K Hz only with normal tympanogram. Excellent WRS. Right ear SRT 20; left ear 30.    Review of Systems    All other systems have been reviewed and are negative for complaints except for those mentioned in history of present illness, past medical history and problem list       Objective   Physical Exam    CONSTITUTIONAL: No acute distress, normal facial features; No fever; no chills  VOICE: No hoarseness or other audible abnormality  RESPIRATION: Breathing comfortably, no stridor; normal breathing effort  CV: No cyanosis visible on the face and neck area  EYES:Pupils equal and round ; no erythema; conjunctiva clear; sclera white  NEURO: Alert and oriented, able to raise eyebrows symmetrical bilateral, smile with no facial droop, able to swallow  HEAD AND FACE: Symmetric facial features, no masses or lesions    Right ear examination: External ear normal. EAC clear. TM intact without effusion, retraction or perforation.  Left ear examination: External ear normal. EAC clear. TM intact without effusion, retraction or  perforation.    Tuning fork 512 Hz    Simons midline  Rinne AC>BC    NOSE: External nose midline; inferior nasal turbinates pale and boggy and hypertrophied; clear nasal drainage no polyps.   ORAL CAVITY: No lesions of external lips; uvula is midline; tongue with good mobility; no gross mass in oral cavity; mucosa appears pink   NECK/LYMPH: No obvious deformity or lesions; trachea is midline  PSYCH: Alert and oriented with appropriate mood and affect    Patient ID: Desiree Cochran is a 82 y.o. female.    Procedures    PROCEDURE NOTE:   Procedure: Nasal endoscopy - diagnostic  Indication: concern for left otalgia; possible Eustachian tube obstruction.  Informed Consent obtained: risks, benefits, alternatives, and expectations discussed with pt and the pt wishes to proceed.     Findings: After topical decongestion with decongestant and anesthetic spray, nasal endoscopy was performed using an endoscope. The septum was midline. The inferior turbinates pale and boggy and hypertrophied.  The middle turbinates appeared hypertrophied the middle meatus is free of purulence, masses, lesions or polyps. The nasal passageway is patent. The nasopharynx was clear.  The left Eustachian tube with  obstructing mucous plug. The right Eustachian tube has  mild mucous drainage without obstruction. There were no complications and the patient tolerated the procedure well.         Assessment/Plan       Problem List Items Addressed This Visit       Post-nasal drainage    Relevant Medications    Ryaltris 665-25 mcg/spray spray,non-aerosol    Dysfunction of left eustachian tube - Primary    Relevant Medications    Ryaltris 665-25 mcg/spray spray,non-aerosol    Left ear pain    Relevant Medications    Ryaltris 665-25 mcg/spray spray,non-aerosol    Sensorineural hearing loss (SNHL) of both ears     Other Visit Diagnoses       Hypertrophy of nasal turbinates             Her ear exam showed no evidence of infection or obstruction from the canal. The  mucus plug is likely contributing to the blocked ear sensation. She will trial Ryaltris  to help with nasal turbinate hypertrophy and the ETD.  She will follow up in  two months for re-evaluation.

## 2023-10-27 PROBLEM — H92.02 LEFT EAR PAIN: Status: ACTIVE | Noted: 2023-10-27

## 2023-10-27 PROBLEM — H90.3 SENSORINEURAL HEARING LOSS (SNHL) OF BOTH EARS: Status: ACTIVE | Noted: 2023-10-27

## 2023-10-27 NOTE — PATIENT INSTRUCTIONS
No ear infection.    Use Ryaltris nasal spray 2 sprays each nostrils once daily.  Clean your nose with saline irrigation once daily.   Follow up in 2 months for re-evaluation.

## 2023-12-28 ENCOUNTER — APPOINTMENT (OUTPATIENT)
Dept: OTOLARYNGOLOGY | Facility: CLINIC | Age: 83
End: 2023-12-28
Payer: MEDICARE

## 2024-01-08 RX ORDER — SIMVASTATIN 10 MG/1
10 TABLET, FILM COATED ORAL NIGHTLY
Qty: 90 TABLET | Refills: 3 | OUTPATIENT
Start: 2024-01-08

## 2024-01-22 DIAGNOSIS — E78.5 DYSLIPIDEMIA: Primary | ICD-10-CM

## 2024-01-22 RX ORDER — SIMVASTATIN 10 MG/1
10 TABLET, FILM COATED ORAL NIGHTLY
Qty: 90 TABLET | Refills: 3 | OUTPATIENT
Start: 2024-01-22

## 2024-01-24 ENCOUNTER — TELEPHONE (OUTPATIENT)
Dept: SCHEDULING | Age: 84
End: 2024-01-24
Payer: MEDICARE

## 2024-01-24 NOTE — TELEPHONE ENCOUNTER
Patient last seen by Dr. Moreno on 7/21/20, and she was instructed to follow up as needed. Patient requesting refill for simvastatin, Dr. Moreno unable to refill as she is not currently under his care. Patient instructed to reach out to PCP for refill of medication.

## 2024-01-25 RX ORDER — SIMVASTATIN 10 MG/1
10 TABLET, FILM COATED ORAL NIGHTLY
Qty: 90 TABLET | Refills: 0 | Status: SHIPPED | OUTPATIENT
Start: 2024-01-25 | End: 2024-04-18 | Stop reason: SDUPTHER

## 2024-02-01 ENCOUNTER — OFFICE VISIT (OUTPATIENT)
Dept: OTOLARYNGOLOGY | Facility: CLINIC | Age: 84
End: 2024-02-01
Payer: MEDICARE

## 2024-02-01 VITALS — TEMPERATURE: 97.1 F | HEIGHT: 57 IN | BODY MASS INDEX: 22.76 KG/M2 | WEIGHT: 105.5 LBS

## 2024-02-01 DIAGNOSIS — H90.3 SENSORINEURAL HEARING LOSS (SNHL) OF BOTH EARS: ICD-10-CM

## 2024-02-01 DIAGNOSIS — H61.21 IMPACTED CERUMEN OF RIGHT EAR: Primary | ICD-10-CM

## 2024-02-01 PROCEDURE — 1160F RVW MEDS BY RX/DR IN RCRD: CPT | Performed by: NURSE PRACTITIONER

## 2024-02-01 PROCEDURE — 1159F MED LIST DOCD IN RCRD: CPT | Performed by: NURSE PRACTITIONER

## 2024-02-01 PROCEDURE — 69210 REMOVE IMPACTED EAR WAX UNI: CPT | Performed by: NURSE PRACTITIONER

## 2024-02-01 PROCEDURE — 1036F TOBACCO NON-USER: CPT | Performed by: NURSE PRACTITIONER

## 2024-02-01 PROCEDURE — 1125F AMNT PAIN NOTED PAIN PRSNT: CPT | Performed by: NURSE PRACTITIONER

## 2024-02-01 PROCEDURE — 99212 OFFICE O/P EST SF 10 MIN: CPT | Performed by: NURSE PRACTITIONER

## 2024-02-01 ASSESSMENT — PATIENT HEALTH QUESTIONNAIRE - PHQ9
SUM OF ALL RESPONSES TO PHQ9 QUESTIONS 1 AND 2: 0
2. FEELING DOWN, DEPRESSED OR HOPELESS: NOT AT ALL
1. LITTLE INTEREST OR PLEASURE IN DOING THINGS: NOT AT ALL

## 2024-02-01 NOTE — PROGRESS NOTES
"Subjective   Patient ID: Desiree Cochran is a 83 y.o. female who presents for Follow-up.  HPI    Patient is here for ear canal check. She states that she has some \"clicking\" noise in the right ear.  She had just obtained her hearing aids last week. She does not have ear pain or drainage.  No recent ear infection or URI.     Review of Systems    All other systems have been reviewed and are negative for complaints except for those mentioned in history of present illness, past medical history and problem list       Objective   Physical Exam    CONSTITUTIONAL: No acute distress, normal facial features; No fever; no chills  VOICE: No hoarseness or other audible abnormality  RESPIRATION: Breathing comfortably, no stridor; normal breathing effort  CV: No cyanosis visible on the face and neck area  EYES:Pupils equal and round ; no erythema; conjunctiva clear; sclera white  NEURO: Alert and oriented, able to raise eyebrows symmetrical bilateral, smile with no facial droop, able to swallow  HEAD AND FACE: Symmetric facial features, no masses or lesions    Right ear examination: External ear normal. EAC is clear. TM intact  without effusion, retraction or perforation.   Left ear examination: External ear normal. Eac with wax superiorly. TM intact without effusion, retraction or perforation.     NOSE: External nose midline; nasal turbinates normal no mucopus  or polyps.   ORAL CAVITY: No lesions of external lips; uvula is midline; tongue with good mobility; no gross mass in oral cavity; mucosa appears pink   NECK/LYMPH: No obvious deformity or lesions; trachea is midline  PSYCH: Alert and oriented with appropriate mood and affect.    Patient ID: Desiree Cochran is a 83 y.o. female.    Procedures  Cerumen removal    Consent:  The planned procedure is discussed including possible risk, benefits and alternative treatments reviewed.  Verbal consent is obtained.    Indications:Obstructed cerumen is noted affecting hearing and causing " discomfort.    Procedure: The ears are examined microscopically.  Using speculum, alligator and #7 suction the obstructive cerumen in right ear removed.    Findings: Cerumen and epithelial debris obstruction in both external auditory canals.  Inspection of tympanic membrane after cleaning showed intact with no effusion, retraction or perforation.    Post procedure: The patient tolerated the procedure well without complications       Assessment/Plan       Problem List Items Addressed This Visit       Sensorineural hearing loss (SNHL) of both ears     Other Visit Diagnoses       Impacted cerumen of right ear    -  Primary         The cerumen was removed from the right ear. After cleaning patient noticed that the clicking noise is gone.  It must have been the wax rubbing against the  ear piece.  She may follow up as needed for ear cleaning.          ABIGAIL Keller-CNP 02/01/24 2:04 PM

## 2024-02-05 ENCOUNTER — ALLIED HEALTH (OUTPATIENT)
Dept: INTEGRATIVE MEDICINE | Facility: CLINIC | Age: 84
End: 2024-02-05
Payer: MEDICARE

## 2024-02-05 DIAGNOSIS — M79.10 MYALGIA: ICD-10-CM

## 2024-02-05 DIAGNOSIS — M99.03 SEGMENTAL AND SOMATIC DYSFUNCTION OF LUMBAR REGION: Primary | ICD-10-CM

## 2024-02-05 DIAGNOSIS — M54.17 LUMBOSACRAL RADICULOPATHY: ICD-10-CM

## 2024-02-05 DIAGNOSIS — M99.01 SEGMENTAL AND SOMATIC DYSFUNCTION OF CERVICAL REGION: ICD-10-CM

## 2024-02-05 DIAGNOSIS — M53.9 MULTILEVEL DEGENERATIVE DISC DISEASE: ICD-10-CM

## 2024-02-05 DIAGNOSIS — M99.04 SEGMENTAL AND SOMATIC DYSFUNCTION OF SACRAL REGION: ICD-10-CM

## 2024-02-05 DIAGNOSIS — M99.02 SEGMENTAL AND SOMATIC DYSFUNCTION OF THORACIC REGION: ICD-10-CM

## 2024-02-05 DIAGNOSIS — M54.2 CERVICALGIA: ICD-10-CM

## 2024-02-05 PROCEDURE — 99203 OFFICE O/P NEW LOW 30 MIN: CPT | Performed by: CHIROPRACTOR

## 2024-02-05 NOTE — PROGRESS NOTES
Subjective   Patient ID: Desiree Cochran is a 83 y.o. female who presents today, February 5, 2024 for a new patient evaluation and treatment of low back pain, neck pain, and upper back pain.    Medicare    Chiropractic Medicine HPI:  Provacative factors include : sitting and lifting  Palliative factors incude : heat ice medications massage  Pain is described as : ache dull numbness stiff tingling   Previous treatment for complaint has included : heat ice NSAIDS massage  Patient denies : difficulty walking bladder weakness bowel weakness catching clicking grinding instability popping radiating pain into the distal extremity trauma  Intensity of the pain since last visit: Patient rates least severe pain at 4/10 on a numerical pain scale; Patient rates most severe pain at 6/10 on a numerical pain scale  Oswestry Disability Index has been completed: yes     Initial exam:  Desiree Cochran presents for evaluation and treatment of chronic low back pain, neck pain, and upper back pain. She states that her low back pain symptoms have been present for years and occurs constantly. She states that her symptoms stem from a fall that occurred 30+ years ago. She states that her low back pain has progressively worsened. She states that she has been experiencing numbness and tingling down her right leg to the foot. She states that lifting objects and sitting aggravates her low back symptoms. Desiree also presents with neck pain and upper back pain. She states that she finds it difficult to lay flat. She states that she has noticed decreased range of motion and stiffness. She reports that she has noticed that her neck and upper back symptoms have also progressively worsened. She states that she does experience headaches.     XR Cervical Spine:   FINDINGS:  Reversal normal cervical lordosis is seen.  Osteopenia is present. The prevertebral soft tissues are unremarkable.  Endplate sclerosis and spur formation is seen throughout the  cervical  spine. Narrowing of C3-4, C4-5 C5-6 and C6-7 disc space is seen.  Uncovertebral joint hypertrophy seen.  No acute fracture or dislocation is seen.    XR Lumbar Spine:  FINDINGS:  Mild levoscoliosis is noted.  Transitional level is present and will be referred to as L6 in this  dictation. Osteopenia is present.  End-plate sclerosis and spur formation is seen throughout the lumbar  spine. Facet hypertrophy is identified. Narrowing of L3-4 L5-6 and  L6-S1 disc space is noted. Grade 1 anterolisthesis of L5-L6 is seen.  No acute fracture or dislocation is seen.      Objective   Review of Systems   Constitutional: Negative.    Eyes: Negative.    Respiratory: Negative.     Cardiovascular: Negative.    Gastrointestinal: Negative.    Genitourinary: Negative.    Musculoskeletal:  Positive for arthralgias, back pain, myalgias, neck pain and neck stiffness.   Neurological:  Positive for weakness, numbness and headaches.   Hematological: Negative.    Psychiatric/Behavioral: Negative.     All other systems reviewed and are negative.    Physical Exam  Neurological:      General: No focal deficit present.      Mental Status: She is alert and oriented to person, place, and time.      Cranial Nerves: No dysarthria or facial asymmetry.      Sensory: Sensation is intact.      Motor: Motor function is intact.      Coordination: Coordination is intact.      Gait: Gait is intact.        Spine Musculoskeletal Exam    Gait    Gait is normal.    Inspection    Leg length disparity: right greater than left    Palpation    Cervical Spine    Tenderness: present      Paraspinous: right and left      Trapezius: left      Spinous process: upper cervical, mid cervical and lower cervical    Right      Masses: none      Spasms: none      Crepitus: none      Muscle tone: normal    Left      Masses: none      Spasms: none      Crepitus: none      Muscle tone: normal    Thoracolumbar    Tenderness: present      Paraspinous: right and left       Spinous process: mid lumbar and lower lumbar    Right      Masses: none      Spasms: none      Crepitus: none      Muscle tone: increased    Left      Masses: none      Spasms: none      Crepitus: none      Muscle tone: increased    Range of Motion    Cervical Spine       Cervical flexion: 1-2 finger breadths. Cervical flexion detail: guarding.     Cervical extension: reduced extension (26-50%). Cervical extension detail: pain.       Right      Lateral bending: reduced bend (51-75%). Lateral bending detail: pain.       Lateral rotation: reduced rotation (51-75%). Lateral rotation detail: pain and guarding.       Left      Lateral bending: reduced bend (51-75%). Lateral bending detail: pain.       Lateral rotation: reduced rotation (51-75%). Lateral rotation detail: pain and guarding.      Thoracolumbar       Flexion: normal. Flexion detail: no pain.     Extension: 50%. Extension detail: pain.       Right      Lateral bendin%. Lateral bending detail: pain.       Lateral rotation: 75%. Lateral rotation detail: guarding.       Left      Lateral bendin%. Lateral bending detail: pain.       Lateral rotation: 75%. Lateral rotation detail: guarding.      Strength    Cervical Spine    Cervical spine motor exam is normal.    Thoracolumbar    Thoracolumbar motor exam is normal.       General    Neurological: alert     Orthopedic Tests:  Cervical:   Neutral compression negative.  Right Maximum compression positive and with local pain.  Left Maximum compression positive and with local pain.  Cervical distraction negative.  VBI Test negative.    Lumbar/Sacrum:   Right Yeoman's negative.  Left Yeoman's negative.  Right Nachlas' positive and with local pain.  Left Nachlas' positive and with local pain.  Right Slump Test negative.  Left Slump Test negative.    Segmental Joint(s): Segmental joint dysfunction was assessed with motion palpation and is identified in the following areas:  Cervical : C1 C2  Thoracic : T2. and  T10  Lumbopelvic / Sacral SIJ : L5, L5/S1, and L SIJ    Assessment/Plan   No treatment performed today.     Treatment Plan:   The patient and I discussed the risks and benefits of Chiropractic Care. Consent for care was given both written and orally by the patient.    Based on the patient's subjective complaints along with the examination findings, it is advised that a course of Chiropractic Treatment by initiated in the form of:   Chiropractic Manipulation (CMT)    Chiropractic treatment recommended at a frequency of 1 times per week for 4 weeks, in conjunction with other providers and treatment modalities.    The goals of the treatment will be to: decrease pain, increase activity, increase range of motion, reduce lower back pain, reduce neck pain, improve quality of life, improve ability to walk, decrease muscular hypertonicity, increase functional capacity, and improve postural strength    The patient tolerated today's treatment with little or no additional discomfort and was instructed to contact the office for questions or concerns.     I have placed orders for the following:  Lumbar X-ray(s) and Cervical X-ray(s)    Follow up as scheduled.

## 2024-02-07 ENCOUNTER — HOSPITAL ENCOUNTER (OUTPATIENT)
Dept: RADIOLOGY | Facility: CLINIC | Age: 84
Discharge: HOME | End: 2024-02-07
Payer: MEDICARE

## 2024-02-07 ENCOUNTER — LAB (OUTPATIENT)
Dept: LAB | Facility: LAB | Age: 84
End: 2024-02-07
Payer: MEDICARE

## 2024-02-07 ENCOUNTER — OFFICE VISIT (OUTPATIENT)
Dept: PRIMARY CARE | Facility: CLINIC | Age: 84
End: 2024-02-07
Payer: MEDICARE

## 2024-02-07 VITALS
DIASTOLIC BLOOD PRESSURE: 73 MMHG | HEART RATE: 92 BPM | SYSTOLIC BLOOD PRESSURE: 169 MMHG | WEIGHT: 106.4 LBS | BODY MASS INDEX: 23.02 KG/M2

## 2024-02-07 DIAGNOSIS — M53.9 MULTILEVEL DEGENERATIVE DISC DISEASE: ICD-10-CM

## 2024-02-07 DIAGNOSIS — M54.17 LUMBOSACRAL NEURITIS: ICD-10-CM

## 2024-02-07 DIAGNOSIS — E78.5 DYSLIPIDEMIA: ICD-10-CM

## 2024-02-07 DIAGNOSIS — M54.2 CERVICALGIA: ICD-10-CM

## 2024-02-07 DIAGNOSIS — R93.1 ELEVATED CORONARY ARTERY CALCIUM SCORE: ICD-10-CM

## 2024-02-07 DIAGNOSIS — H90.3 SENSORINEURAL HEARING LOSS (SNHL) OF BOTH EARS: ICD-10-CM

## 2024-02-07 DIAGNOSIS — M54.17 LUMBOSACRAL RADICULOPATHY: ICD-10-CM

## 2024-02-07 DIAGNOSIS — N76.0 VAGINITIS AND VULVOVAGINITIS: ICD-10-CM

## 2024-02-07 DIAGNOSIS — I10 PRIMARY HYPERTENSION: ICD-10-CM

## 2024-02-07 DIAGNOSIS — I10 PRIMARY HYPERTENSION: Primary | ICD-10-CM

## 2024-02-07 PROBLEM — I87.2 CHRONIC VENOUS INSUFFICIENCY: Status: ACTIVE | Noted: 2021-01-14

## 2024-02-07 PROBLEM — H92.02 LEFT EAR PAIN: Status: RESOLVED | Noted: 2023-10-27 | Resolved: 2024-02-07

## 2024-02-07 LAB
ALBUMIN SERPL BCP-MCNC: 4.6 G/DL (ref 3.4–5)
ALP SERPL-CCNC: 86 U/L (ref 33–136)
ALT SERPL W P-5'-P-CCNC: 17 U/L (ref 7–45)
ANION GAP SERPL CALC-SCNC: 13 MMOL/L (ref 10–20)
AST SERPL W P-5'-P-CCNC: 23 U/L (ref 9–39)
BILIRUB SERPL-MCNC: 1.1 MG/DL (ref 0–1.2)
BUN SERPL-MCNC: 9 MG/DL (ref 6–23)
CALCIUM SERPL-MCNC: 10.3 MG/DL (ref 8.6–10.6)
CHLORIDE SERPL-SCNC: 101 MMOL/L (ref 98–107)
CHOLEST SERPL-MCNC: 186 MG/DL (ref 0–199)
CHOLESTEROL/HDL RATIO: 2.8
CK SERPL-CCNC: 64 U/L (ref 0–215)
CO2 SERPL-SCNC: 30 MMOL/L (ref 21–32)
CREAT SERPL-MCNC: 0.95 MG/DL (ref 0.5–1.05)
EGFRCR SERPLBLD CKD-EPI 2021: 60 ML/MIN/1.73M*2
GLUCOSE SERPL-MCNC: 97 MG/DL (ref 74–99)
HDLC SERPL-MCNC: 66.3 MG/DL
LDLC SERPL CALC-MCNC: 98 MG/DL
NON HDL CHOLESTEROL: 120 MG/DL (ref 0–149)
POTASSIUM SERPL-SCNC: 4.7 MMOL/L (ref 3.5–5.3)
PROT SERPL-MCNC: 7.5 G/DL (ref 6.4–8.2)
SODIUM SERPL-SCNC: 139 MMOL/L (ref 136–145)
TRIGL SERPL-MCNC: 108 MG/DL (ref 0–149)
VLDL: 22 MG/DL (ref 0–40)

## 2024-02-07 PROCEDURE — 1160F RVW MEDS BY RX/DR IN RCRD: CPT | Performed by: INTERNAL MEDICINE

## 2024-02-07 PROCEDURE — 80053 COMPREHEN METABOLIC PANEL: CPT

## 2024-02-07 PROCEDURE — 1159F MED LIST DOCD IN RCRD: CPT | Performed by: INTERNAL MEDICINE

## 2024-02-07 PROCEDURE — 72110 X-RAY EXAM L-2 SPINE 4/>VWS: CPT | Performed by: RADIOLOGY

## 2024-02-07 PROCEDURE — 3077F SYST BP >= 140 MM HG: CPT | Performed by: INTERNAL MEDICINE

## 2024-02-07 PROCEDURE — 82550 ASSAY OF CK (CPK): CPT

## 2024-02-07 PROCEDURE — 72110 X-RAY EXAM L-2 SPINE 4/>VWS: CPT

## 2024-02-07 PROCEDURE — 36415 COLL VENOUS BLD VENIPUNCTURE: CPT

## 2024-02-07 PROCEDURE — 1125F AMNT PAIN NOTED PAIN PRSNT: CPT | Performed by: INTERNAL MEDICINE

## 2024-02-07 PROCEDURE — 80061 LIPID PANEL: CPT

## 2024-02-07 PROCEDURE — 3078F DIAST BP <80 MM HG: CPT | Performed by: INTERNAL MEDICINE

## 2024-02-07 PROCEDURE — 99214 OFFICE O/P EST MOD 30 MIN: CPT | Performed by: INTERNAL MEDICINE

## 2024-02-07 PROCEDURE — 72040 X-RAY EXAM NECK SPINE 2-3 VW: CPT | Performed by: RADIOLOGY

## 2024-02-07 PROCEDURE — 72040 X-RAY EXAM NECK SPINE 2-3 VW: CPT

## 2024-02-07 PROCEDURE — 1036F TOBACCO NON-USER: CPT | Performed by: INTERNAL MEDICINE

## 2024-02-07 RX ORDER — CLOBETASOL PROPIONATE 0.5 MG/G
OINTMENT TOPICAL 2 TIMES DAILY PRN
Qty: 30 G | Refills: 1 | Status: SHIPPED | OUTPATIENT
Start: 2024-02-07

## 2024-02-07 ASSESSMENT — ENCOUNTER SYMPTOMS
DIZZINESS: 0
CHEST TIGHTNESS: 0
NAUSEA: 0
CONSTIPATION: 0
SHORTNESS OF BREATH: 0
ACTIVITY CHANGE: 0
WHEEZING: 0
ABDOMINAL DISTENTION: 0
NUMBNESS: 0
DYSURIA: 0
HEADACHES: 0
VOMITING: 0
WEAKNESS: 0
DIARRHEA: 0
HEMATURIA: 0
DIAPHORESIS: 0
ABDOMINAL PAIN: 0
FREQUENCY: 0
COUGH: 0
FATIGUE: 0
LIGHT-HEADEDNESS: 1
PALPITATIONS: 0

## 2024-02-07 NOTE — PROGRESS NOTES
Desiree Cochran comes in today for a comprehensive follow up.      Ms. Cochran comes in today for comprehensive follow-up.  She is accompanied by her daughter.  She is feeling reasonably well.  She would like to get back in and see a cardiologist, she did not realize that it had been several years since she last visited 1.  She continues on her medications with no change.  She feels well in general, denying any headaches, dizziness, chest pain, shortness of breath, cough, nausea, vomiting, nor changes in bowel or bladder habits.  She does have a mild vaginitis that she uses clobetasol ointment intermittently, she would like a refill on this.  Blood pressures at home have been outstanding, consistently in the 110-120/60's.  She is amenable to having lab work done, she did have some oatmeal this morning.        Review of Systems   Constitutional:  Negative for activity change, diaphoresis and fatigue.   Respiratory:  Negative for cough, chest tightness, shortness of breath and wheezing.    Cardiovascular:  Negative for chest pain, palpitations and leg swelling.   Gastrointestinal:  Negative for abdominal distention, abdominal pain, constipation, diarrhea, nausea and vomiting.   Genitourinary:  Negative for dysuria, frequency and hematuria.   Neurological:  Positive for light-headedness. Negative for dizziness, weakness, numbness and headaches.       Objective   Physical Exam  Constitutional:       General: She is not in acute distress.     Appearance: She is normal weight. She is not diaphoretic.   Cardiovascular:      Rate and Rhythm: Normal rate and regular rhythm.      Pulses: Normal pulses.      Heart sounds: Normal heart sounds. No murmur heard.     No gallop.   Pulmonary:      Effort: Pulmonary effort is normal. No respiratory distress.      Breath sounds: Normal breath sounds. No wheezing, rhonchi or rales.   Abdominal:      General: Abdomen is flat. Bowel sounds are normal. There is no distension.      Tenderness:  There is no abdominal tenderness. There is no guarding or rebound.   Musculoskeletal:      Right lower leg: No edema.      Left lower leg: No edema.   Neurological:      Mental Status: She is alert.         Assessment/Plan   1.  Hypertension: Significantly elevated here in the office, running in excellent ranges at home.  Continue current medications.  She would like to reestablish with cardiology, order placed.  Update BMP.  Contact us if refills needed.  2: Hyperlipidemia: Due for updated comprehensive labs.  Follow-up on results once available.  3.  Elevated coronary calcium scoring scan: She would like to reestablish with cardiology.  Referral placed.  4.  Atrophic vaginitis: Continues with clobetasol ointment, refill sent to pharmacy.  5.  Lumbosacral neuritis: Follows with chiropractic care, doing well.  6.  Hearing loss: New hearing aids, getting used to these.    Happy to see her back in the fall for her wellness visit.  She is to call with any additional questions.  Problem List Items Addressed This Visit    None

## 2024-02-07 NOTE — PATIENT INSTRUCTIONS
It was a pleasure seeing you in the office today.  We will follow up on all comprehensive blood work once results are available and make any recommendations based on these results as may be indicated.  Referral has been placed for cardiology specialist.  If you have any questions or need additional refills, please contact us.  Otherwise, we will plan on seeing you back for your wellness visit in the fall.

## 2024-02-10 ASSESSMENT — ENCOUNTER SYMPTOMS
GASTROINTESTINAL NEGATIVE: 1
HEMATOLOGIC/LYMPHATIC NEGATIVE: 1
RESPIRATORY NEGATIVE: 1
ARTHRALGIAS: 1
PSYCHIATRIC NEGATIVE: 1
NECK STIFFNESS: 1
NUMBNESS: 1
MYALGIAS: 1
NECK PAIN: 1
WEAKNESS: 1
BACK PAIN: 1
CONSTITUTIONAL NEGATIVE: 1
CARDIOVASCULAR NEGATIVE: 1
EYES NEGATIVE: 1
HEADACHES: 1

## 2024-02-14 DIAGNOSIS — R10.13 EPIGASTRIC ABDOMINAL PAIN: ICD-10-CM

## 2024-02-14 DIAGNOSIS — I10 PRIMARY HYPERTENSION: ICD-10-CM

## 2024-02-14 RX ORDER — LOSARTAN POTASSIUM 100 MG/1
100 TABLET ORAL DAILY
Qty: 90 TABLET | Refills: 1 | Status: SHIPPED | OUTPATIENT
Start: 2024-02-14

## 2024-02-14 RX ORDER — OMEPRAZOLE 20 MG/1
20 CAPSULE, DELAYED RELEASE ORAL EVERY MORNING
Qty: 90 CAPSULE | Refills: 3 | Status: SHIPPED | OUTPATIENT
Start: 2024-02-14

## 2024-02-18 NOTE — PROGRESS NOTES
Subjective   Patient ID: Desiree Cochran is a 83 y.o. female who presents February 18, 2024 for chiropractic care.    Medicare    Today, the patient rates their degree of pain as a 5 out of 10 on the numeric pain rating scale.     Desiree returns for continued chiropractic care. She presents with continued neck pain and stiffness, in addition to sciatica symptoms along the right lower extremity. The patient denies any changes in health since her last encounter and will follow up as scheduled.    _________________________________________  Initial exam:  Desiree Cochran presents for evaluation and treatment of chronic low back pain, neck pain, and upper back pain. She states that her low back pain symptoms have been present for years and occurs constantly. She states that her symptoms stem from a fall that occurred 30+ years ago. She states that her low back pain has progressively worsened. She states that she has been experiencing numbness and tingling down her right leg to the foot. She states that lifting objects and sitting aggravates her low back symptoms. Desiree also presents with neck pain and upper back pain. She states that she finds it difficult to lay flat. She states that she has noticed decreased range of motion and stiffness. She reports that she has noticed that her neck and upper back symptoms have also progressively worsened. She states that she does experience headaches.     XR Cervical Spine:   FINDINGS:  Reversal normal cervical lordosis is seen.  Osteopenia is present. The prevertebral soft tissues are unremarkable.  Endplate sclerosis and spur formation is seen throughout the cervical  spine. Narrowing of C3-4, C4-5 C5-6 and C6-7 disc space is seen.  Uncovertebral joint hypertrophy seen.  No acute fracture or dislocation is seen.    XR Lumbar Spine:  FINDINGS:  Mild levoscoliosis is noted.  Transitional level is present and will be referred to as L6 in this  dictation. Osteopenia is present.  End-plate  sclerosis and spur formation is seen throughout the lumbar  spine. Facet hypertrophy is identified. Narrowing of L3-4 L5-6 and  L6-S1 disc space is noted. Grade 1 anterolisthesis of L5-L6 is seen.  No acute fracture or dislocation is seen.      Objective   Physical Exam  Neurological:      General: No focal deficit present.      Mental Status: She is alert and oriented to person, place, and time.      Cranial Nerves: No dysarthria or facial asymmetry.      Sensory: Sensation is intact.      Motor: Motor function is intact.      Coordination: Coordination is intact.      Gait: Gait is intact.       Palpation of the following region(s) revealed:  Cervical: Scalenes bilateral, hypertonicity and tenderness.  Upper trapezius bilateral, hypertonicity and tenderness.  Levator scapulae bilateral, hypertonicity and tenderness.  Cervical paraspinals bilateral, hypertonicity and tenderness.  Thoracic: Thoracic paraspinals bilateral, hypertonicity and tenderness.  Middle trapezius bilateral, hypertonicity and tenderness.  Rhomboids bilateral, hypertonicity and tenderness.  Lumbar: Lumbar paraspinals right, hypertonicity and tenderness.  Quadratus lumborum right, hypertonicity and tenderness.  Gluteal right, hypertonicity and tenderness.  Piriformis right, hypertonicity and tenderness.        Segmental Joint(s): Segmental joint dysfunction was assessed with motion palpation and is identified in the following areas:  Cervical : C2 C5 C6  Thoracic : T1, T2., T8, and T10  Lumbopelvic / Sacral SIJ : L1, L5/S1, R SIJ, and L SIJ      Assessment/Plan   Today's Treatment Included: Chiropractic manipulation to the Segmental Joint(s) Cervical : C2 C5 C6 Segmental Joint(s) Lumbopelvic/Sacral SIJ : L1, L5/S1, R SIJ, and L SIJ Segmental Joint(s) Thoracic : T1, T2., T8, and T10   Treatment Techniques Used : Activator/Tool assisted technique  Pin and stretch    Soft-tissue mobilization was performed in the following areas:   Cervical  paraspinal mm. bilateral, Scalenes bilateral, Upper Trapezius bilateral, and Levator Scap. bilateral  Thoracic Paraspinal mm. bilateral, Middle Trapezius bilateral, and Rhomboids bilateral  Lumbar Paraspinal mm. right, Quadratus Lumborum right, Gluteal mm. Glute. Max.  and Glute. Med. right, and Piriformis right            The patient tolerated today's treatment with little or no additional discomfort and was instructed to contact the office for questions or concerns.

## 2024-02-19 ENCOUNTER — ALLIED HEALTH (OUTPATIENT)
Dept: INTEGRATIVE MEDICINE | Facility: CLINIC | Age: 84
End: 2024-02-19
Payer: MEDICARE

## 2024-02-19 DIAGNOSIS — M53.9 MULTILEVEL DEGENERATIVE DISC DISEASE: ICD-10-CM

## 2024-02-19 DIAGNOSIS — M99.01 SEGMENTAL AND SOMATIC DYSFUNCTION OF CERVICAL REGION: ICD-10-CM

## 2024-02-19 DIAGNOSIS — M79.10 MYALGIA: ICD-10-CM

## 2024-02-19 DIAGNOSIS — M54.2 CERVICALGIA: ICD-10-CM

## 2024-02-19 DIAGNOSIS — M99.02 SEGMENTAL AND SOMATIC DYSFUNCTION OF THORACIC REGION: ICD-10-CM

## 2024-02-19 DIAGNOSIS — M54.17 LUMBOSACRAL RADICULOPATHY: ICD-10-CM

## 2024-02-19 DIAGNOSIS — M99.03 SEGMENTAL AND SOMATIC DYSFUNCTION OF LUMBAR REGION: Primary | ICD-10-CM

## 2024-02-19 DIAGNOSIS — M99.04 SEGMENTAL AND SOMATIC DYSFUNCTION OF SACRAL REGION: ICD-10-CM

## 2024-02-19 PROCEDURE — 98941 CHIROPRACT MANJ 3-4 REGIONS: CPT | Performed by: CHIROPRACTOR

## 2024-02-27 ENCOUNTER — ALLIED HEALTH (OUTPATIENT)
Dept: INTEGRATIVE MEDICINE | Facility: CLINIC | Age: 84
End: 2024-02-27
Payer: MEDICARE

## 2024-02-27 DIAGNOSIS — M79.10 MYALGIA: ICD-10-CM

## 2024-02-27 DIAGNOSIS — M99.03 SEGMENTAL AND SOMATIC DYSFUNCTION OF LUMBAR REGION: Primary | ICD-10-CM

## 2024-02-27 DIAGNOSIS — M99.02 SEGMENTAL AND SOMATIC DYSFUNCTION OF THORACIC REGION: ICD-10-CM

## 2024-02-27 DIAGNOSIS — M54.2 CERVICALGIA: ICD-10-CM

## 2024-02-27 DIAGNOSIS — M99.04 SEGMENTAL AND SOMATIC DYSFUNCTION OF SACRAL REGION: ICD-10-CM

## 2024-02-27 DIAGNOSIS — M53.9 MULTILEVEL DEGENERATIVE DISC DISEASE: ICD-10-CM

## 2024-02-27 DIAGNOSIS — M99.01 SEGMENTAL AND SOMATIC DYSFUNCTION OF CERVICAL REGION: ICD-10-CM

## 2024-02-27 DIAGNOSIS — M54.17 LUMBOSACRAL RADICULOPATHY: ICD-10-CM

## 2024-02-27 PROCEDURE — 98941 CHIROPRACT MANJ 3-4 REGIONS: CPT | Performed by: CHIROPRACTOR

## 2024-02-27 NOTE — PROGRESS NOTES
Subjective   Patient ID: Desiree Cochran is a 83 y.o. female who presents February 27, 2024 for chiropractic care.    Medicare    Today, the patient rates their degree of pain as a 5 out of 10 on the numeric pain rating scale.     Desiree returns for continued chiropractic care. Today, she presents with continued neck stiffness and low back discomfort with radiating symptoms into the right leg. She states that she did notice some improvement in her range of motion. The patient denies any changes in health since her last encounter and will follow up as scheduled.    _________________________________________  Initial exam:  Desiree Cochran presents for evaluation and treatment of chronic low back pain, neck pain, and upper back pain. She states that her low back pain symptoms have been present for years and occurs constantly. She states that her symptoms stem from a fall that occurred 30+ years ago. She states that her low back pain has progressively worsened. She states that she has been experiencing numbness and tingling down her right leg to the foot. She states that lifting objects and sitting aggravates her low back symptoms. Desiree also presents with neck pain and upper back pain. She states that she finds it difficult to lay flat. She states that she has noticed decreased range of motion and stiffness. She reports that she has noticed that her neck and upper back symptoms have also progressively worsened. She states that she does experience headaches.     XR Cervical Spine:   FINDINGS:  Reversal normal cervical lordosis is seen.  Osteopenia is present. The prevertebral soft tissues are unremarkable.  Endplate sclerosis and spur formation is seen throughout the cervical  spine. Narrowing of C3-4, C4-5 C5-6 and C6-7 disc space is seen.  Uncovertebral joint hypertrophy seen.  No acute fracture or dislocation is seen.    XR Lumbar Spine:  FINDINGS:  Mild levoscoliosis is noted.  Transitional level is present and will be  referred to as L6 in this  dictation. Osteopenia is present.  End-plate sclerosis and spur formation is seen throughout the lumbar  spine. Facet hypertrophy is identified. Narrowing of L3-4 L5-6 and  L6-S1 disc space is noted. Grade 1 anterolisthesis of L5-L6 is seen.  No acute fracture or dislocation is seen.      Objective   Physical Exam  Neurological:      General: No focal deficit present.      Mental Status: She is alert and oriented to person, place, and time.      Cranial Nerves: No dysarthria or facial asymmetry.      Sensory: Sensation is intact.      Motor: Motor function is intact.      Coordination: Coordination is intact.      Gait: Gait is intact.       Palpation of the following region(s) revealed:  Cervical: Scalenes bilateral, hypertonicity and tenderness.  Upper trapezius bilateral, hypertonicity and tenderness.  Levator scapulae bilateral, hypertonicity and tenderness.  Cervical paraspinals bilateral, hypertonicity and tenderness.  Thoracic: Thoracic paraspinals bilateral, hypertonicity and tenderness.  Middle trapezius bilateral, hypertonicity and tenderness.  Rhomboids bilateral, hypertonicity and tenderness.  Lumbar: Lumbar paraspinals right, hypertonicity and tenderness.  Quadratus lumborum right, hypertonicity and tenderness.  Gluteal right, hypertonicity and tenderness.  Piriformis right, hypertonicity and tenderness.    Segmental Joint(s): Segmental joint dysfunction was assessed with motion palpation and is identified in the following areas:  Cervical : C2 C5 C6  Thoracic : T1, T2., T8, and T10  Lumbopelvic / Sacral SIJ : L1, L5/S1, R SIJ, and L SIJ    Assessment/Plan   Today's Treatment Included: Chiropractic manipulation to the Segmental Joint(s) Cervical : C2 C5 C6 Segmental Joint(s) Lumbopelvic/Sacral SIJ : L1, L5/S1, R SIJ, and L SIJ Segmental Joint(s) Thoracic : T1, T2., T8, and T10   Treatment Techniques Used : Activator/Tool assisted technique  Pin and stretch    Soft-tissue  mobilization was performed in the following areas:   Cervical paraspinal mm. bilateral, Scalenes bilateral, Upper Trapezius bilateral, and Levator Scap. bilateral  Thoracic Paraspinal mm. bilateral, Middle Trapezius bilateral, and Rhomboids bilateral  Lumbar Paraspinal mm. right, Quadratus Lumborum right, Gluteal mm. Glute. Max.  and Glute. Med. right, and Piriformis right    The patient tolerated today's treatment with little or no additional discomfort and was instructed to contact the office for questions or concerns.

## 2024-03-06 NOTE — PROGRESS NOTES
Subjective   Patient ID: Desiree Cochran is a 83 y.o. female who presents March 7, 2024 for chiropractic care.    Medicare    Today, the patient rates their degree of pain as a 5 out of 10 on the numeric pain rating scale.     Desiree returns for continued chiropractic care. Today, she presents with low back discomfort with radiating symptoms into the right leg. She states that her symptoms have improved since yesterday. The patient denies any changes in health since her last encounter and will follow up as scheduled.    _________________________________________  Initial exam:  Desiree Cochran presents for evaluation and treatment of chronic low back pain, neck pain, and upper back pain. She states that her low back pain symptoms have been present for years and occurs constantly. She states that her symptoms stem from a fall that occurred 30+ years ago. She states that her low back pain has progressively worsened. She states that she has been experiencing numbness and tingling down her right leg to the foot. She states that lifting objects and sitting aggravates her low back symptoms. Desiree also presents with neck pain and upper back pain. She states that she finds it difficult to lay flat. She states that she has noticed decreased range of motion and stiffness. She reports that she has noticed that her neck and upper back symptoms have also progressively worsened. She states that she does experience headaches.     XR Cervical Spine:   FINDINGS:  Reversal normal cervical lordosis is seen.  Osteopenia is present. The prevertebral soft tissues are unremarkable.  Endplate sclerosis and spur formation is seen throughout the cervical  spine. Narrowing of C3-4, C4-5 C5-6 and C6-7 disc space is seen.  Uncovertebral joint hypertrophy seen.  No acute fracture or dislocation is seen.    XR Lumbar Spine:  FINDINGS:  Mild levoscoliosis is noted.  Transitional level is present and will be referred to as L6 in this  dictation. Osteopenia is  present.  End-plate sclerosis and spur formation is seen throughout the lumbar  spine. Facet hypertrophy is identified. Narrowing of L3-4 L5-6 and  L6-S1 disc space is noted. Grade 1 anterolisthesis of L5-L6 is seen.  No acute fracture or dislocation is seen.      Objective   Physical Exam  Neurological:      General: No focal deficit present.      Mental Status: She is alert and oriented to person, place, and time.      Cranial Nerves: No dysarthria or facial asymmetry.      Sensory: Sensation is intact.      Motor: Motor function is intact.      Coordination: Coordination is intact.      Gait: Gait is intact.       Palpation of the following region(s) revealed:  Cervical: Scalenes bilateral, hypertonicity and tenderness.  Upper trapezius bilateral, hypertonicity and tenderness.  Levator scapulae bilateral, hypertonicity and tenderness.  Cervical paraspinals bilateral, hypertonicity and tenderness.  Thoracic: Thoracic paraspinals bilateral, hypertonicity and tenderness.  Middle trapezius bilateral, hypertonicity and tenderness.  Rhomboids bilateral, hypertonicity and tenderness.  Lumbar: Lumbar paraspinals right, hypertonicity and tenderness.  Quadratus lumborum right, hypertonicity and tenderness.  Gluteal right, hypertonicity and tenderness.  Piriformis right, hypertonicity and tenderness.    Segmental Joint(s): Segmental joint dysfunction was assessed with motion palpation and is identified in the following areas:  Cervical : C2 C5 C6  Thoracic : T1, T2., T8, and T10  Lumbopelvic / Sacral SIJ : L1, L5/S1, R SIJ, and L SIJ    Assessment/Plan   Today's Treatment Included: Chiropractic manipulation to the Segmental Joint(s) Cervical : C2 C5 C6 Segmental Joint(s) Lumbopelvic/Sacral SIJ : L1, L5/S1, R SIJ, and L SIJ Segmental Joint(s) Thoracic : T1, T2., T8, and T10   Treatment Techniques Used : Activator/Tool assisted technique and Pelvic blocking technique  Pin and stretch    Soft-tissue mobilization was performed  in the following areas:   Cervical paraspinal mm. bilateral, Scalenes bilateral, Upper Trapezius bilateral, and Levator Scap. bilateral  Thoracic Paraspinal mm. bilateral, Middle Trapezius bilateral, and Rhomboids bilateral  Lumbar Paraspinal mm. right, Quadratus Lumborum right, Gluteal mm. Glute. Max.  and Glute. Med. right, and Piriformis right  All soft tissue treatments performed while patient was prone, on pelvic blocks.     The patient tolerated today's treatment with little or no additional discomfort and was instructed to contact the office for questions or concerns.

## 2024-03-07 ENCOUNTER — ALLIED HEALTH (OUTPATIENT)
Dept: INTEGRATIVE MEDICINE | Facility: CLINIC | Age: 84
End: 2024-03-07
Payer: MEDICARE

## 2024-03-07 DIAGNOSIS — M99.01 SEGMENTAL AND SOMATIC DYSFUNCTION OF CERVICAL REGION: ICD-10-CM

## 2024-03-07 DIAGNOSIS — M53.9 MULTILEVEL DEGENERATIVE DISC DISEASE: ICD-10-CM

## 2024-03-07 DIAGNOSIS — M54.17 LUMBOSACRAL RADICULOPATHY: ICD-10-CM

## 2024-03-07 DIAGNOSIS — M99.04 SEGMENTAL AND SOMATIC DYSFUNCTION OF SACRAL REGION: ICD-10-CM

## 2024-03-07 DIAGNOSIS — M54.2 CERVICALGIA: ICD-10-CM

## 2024-03-07 DIAGNOSIS — M99.02 SEGMENTAL AND SOMATIC DYSFUNCTION OF THORACIC REGION: ICD-10-CM

## 2024-03-07 DIAGNOSIS — M99.03 SEGMENTAL AND SOMATIC DYSFUNCTION OF LUMBAR REGION: Primary | ICD-10-CM

## 2024-03-07 DIAGNOSIS — M79.10 MYALGIA: ICD-10-CM

## 2024-03-07 PROCEDURE — 98941 CHIROPRACT MANJ 3-4 REGIONS: CPT | Performed by: CHIROPRACTOR

## 2024-03-14 ENCOUNTER — ALLIED HEALTH (OUTPATIENT)
Dept: INTEGRATIVE MEDICINE | Facility: CLINIC | Age: 84
End: 2024-03-14
Payer: MEDICARE

## 2024-03-14 DIAGNOSIS — M99.01 SEGMENTAL AND SOMATIC DYSFUNCTION OF CERVICAL REGION: ICD-10-CM

## 2024-03-14 DIAGNOSIS — M99.03 SEGMENTAL AND SOMATIC DYSFUNCTION OF LUMBAR REGION: Primary | ICD-10-CM

## 2024-03-14 DIAGNOSIS — M99.02 SEGMENTAL AND SOMATIC DYSFUNCTION OF THORACIC REGION: ICD-10-CM

## 2024-03-14 DIAGNOSIS — M54.2 CERVICALGIA: ICD-10-CM

## 2024-03-14 DIAGNOSIS — M54.17 LUMBOSACRAL RADICULOPATHY: ICD-10-CM

## 2024-03-14 DIAGNOSIS — M79.10 MYALGIA: ICD-10-CM

## 2024-03-14 DIAGNOSIS — M99.04 SEGMENTAL AND SOMATIC DYSFUNCTION OF SACRAL REGION: ICD-10-CM

## 2024-03-14 DIAGNOSIS — M53.9 MULTILEVEL DEGENERATIVE DISC DISEASE: ICD-10-CM

## 2024-03-14 PROCEDURE — 98941 CHIROPRACT MANJ 3-4 REGIONS: CPT | Performed by: CHIROPRACTOR

## 2024-03-14 NOTE — PROGRESS NOTES
Subjective   Patient ID: Desiree Cochran is a 83 y.o. female who presents March 14, 2024 for chiropractic care.    Medicare    Today, the patient rates their degree of pain as a 3 out of 10 on the numeric pain rating scale.     Desiree returns for continued chiropractic care. Today, she states that she has been doing well this week. The patient denies any changes in health since her last encounter and will follow up as scheduled.    _________________________________________  Initial exam:  Desiree Cochran presents for evaluation and treatment of chronic low back pain, neck pain, and upper back pain. She states that her low back pain symptoms have been present for years and occurs constantly. She states that her symptoms stem from a fall that occurred 30+ years ago. She states that her low back pain has progressively worsened. She states that she has been experiencing numbness and tingling down her right leg to the foot. She states that lifting objects and sitting aggravates her low back symptoms. Desiree also presents with neck pain and upper back pain. She states that she finds it difficult to lay flat. She states that she has noticed decreased range of motion and stiffness. She reports that she has noticed that her neck and upper back symptoms have also progressively worsened. She states that she does experience headaches.     XR Cervical Spine:   FINDINGS:  Reversal normal cervical lordosis is seen.  Osteopenia is present. The prevertebral soft tissues are unremarkable.  Endplate sclerosis and spur formation is seen throughout the cervical  spine. Narrowing of C3-4, C4-5 C5-6 and C6-7 disc space is seen.  Uncovertebral joint hypertrophy seen.  No acute fracture or dislocation is seen.    XR Lumbar Spine:  FINDINGS:  Mild levoscoliosis is noted.  Transitional level is present and will be referred to as L6 in this  dictation. Osteopenia is present.  End-plate sclerosis and spur formation is seen throughout the lumbar  spine.  Facet hypertrophy is identified. Narrowing of L3-4 L5-6 and  L6-S1 disc space is noted. Grade 1 anterolisthesis of L5-L6 is seen.  No acute fracture or dislocation is seen.      Objective   Physical Exam  Neurological:      General: No focal deficit present.      Mental Status: She is alert and oriented to person, place, and time.      Cranial Nerves: No dysarthria or facial asymmetry.      Sensory: Sensation is intact.      Motor: Motor function is intact.      Coordination: Coordination is intact.      Gait: Gait is intact.       Palpation of the following region(s) revealed:  Cervical: Scalenes bilateral, hypertonicity and tenderness.  Upper trapezius bilateral, hypertonicity and tenderness.  Levator scapulae bilateral, hypertonicity and tenderness.  Cervical paraspinals bilateral, hypertonicity and tenderness.  Thoracic: Thoracic paraspinals bilateral, hypertonicity and tenderness.  Middle trapezius bilateral, hypertonicity and tenderness.  Rhomboids bilateral, hypertonicity and tenderness.  Lumbar: Lumbar paraspinals right, hypertonicity and tenderness.  Quadratus lumborum right, hypertonicity and tenderness.  Gluteal right, hypertonicity and tenderness.  Piriformis right, hypertonicity and tenderness.    Segmental Joint(s): Segmental joint dysfunction was assessed with motion palpation and is identified in the following areas:  Cervical : C2 C5 C6  Thoracic : T1, T2., T8, and T10  Lumbopelvic / Sacral SIJ : L1, L5/S1, R SIJ, and L SIJ    Assessment/Plan   Today's Treatment Included: Chiropractic manipulation to the Segmental Joint(s) Cervical : C2 C5 C6 Segmental Joint(s) Lumbopelvic/Sacral SIJ : L1, L5/S1, R SIJ, and L SIJ Segmental Joint(s) Thoracic : T1, T2., T8, and T10   Treatment Techniques Used : Activator/Tool assisted technique and Pelvic blocking technique  Pin and stretch    Soft-tissue mobilization was performed in the following areas:   Cervical paraspinal mm. bilateral, Scalenes bilateral, Upper  Trapezius bilateral, and Levator Scap. bilateral  Thoracic Paraspinal mm. bilateral, Middle Trapezius bilateral, and Rhomboids bilateral  Lumbar Paraspinal mm. right, Quadratus Lumborum right, Gluteal mm. Glute. Max.  and Glute. Med. right, and Piriformis right  All soft tissue treatments performed while patient was prone, on pelvic blocks.     The patient tolerated today's treatment with little or no additional discomfort and was instructed to contact the office for questions or concerns.

## 2024-03-21 ENCOUNTER — ALLIED HEALTH (OUTPATIENT)
Dept: INTEGRATIVE MEDICINE | Facility: CLINIC | Age: 84
End: 2024-03-21
Payer: MEDICARE

## 2024-03-21 DIAGNOSIS — M99.03 SEGMENTAL AND SOMATIC DYSFUNCTION OF LUMBAR REGION: Primary | ICD-10-CM

## 2024-03-21 DIAGNOSIS — M53.9 MULTILEVEL DEGENERATIVE DISC DISEASE: ICD-10-CM

## 2024-03-21 DIAGNOSIS — M54.17 LUMBOSACRAL RADICULOPATHY: ICD-10-CM

## 2024-03-21 DIAGNOSIS — M54.2 CERVICALGIA: ICD-10-CM

## 2024-03-21 DIAGNOSIS — M99.04 SEGMENTAL AND SOMATIC DYSFUNCTION OF SACRAL REGION: ICD-10-CM

## 2024-03-21 DIAGNOSIS — M79.10 MYALGIA: ICD-10-CM

## 2024-03-21 DIAGNOSIS — M99.01 SEGMENTAL AND SOMATIC DYSFUNCTION OF CERVICAL REGION: ICD-10-CM

## 2024-03-21 DIAGNOSIS — M99.02 SEGMENTAL AND SOMATIC DYSFUNCTION OF THORACIC REGION: ICD-10-CM

## 2024-03-21 PROCEDURE — 98941 CHIROPRACT MANJ 3-4 REGIONS: CPT | Performed by: CHIROPRACTOR

## 2024-03-28 ENCOUNTER — ALLIED HEALTH (OUTPATIENT)
Dept: INTEGRATIVE MEDICINE | Facility: CLINIC | Age: 84
End: 2024-03-28
Payer: MEDICARE

## 2024-03-28 DIAGNOSIS — M99.01 SEGMENTAL AND SOMATIC DYSFUNCTION OF CERVICAL REGION: ICD-10-CM

## 2024-03-28 DIAGNOSIS — M54.2 CERVICALGIA: ICD-10-CM

## 2024-03-28 DIAGNOSIS — M79.10 MYALGIA: ICD-10-CM

## 2024-03-28 DIAGNOSIS — M99.03 SEGMENTAL AND SOMATIC DYSFUNCTION OF LUMBAR REGION: Primary | ICD-10-CM

## 2024-03-28 DIAGNOSIS — M54.17 LUMBOSACRAL RADICULOPATHY: ICD-10-CM

## 2024-03-28 DIAGNOSIS — M99.02 SEGMENTAL AND SOMATIC DYSFUNCTION OF THORACIC REGION: ICD-10-CM

## 2024-03-28 DIAGNOSIS — M53.9 MULTILEVEL DEGENERATIVE DISC DISEASE: ICD-10-CM

## 2024-03-28 DIAGNOSIS — M99.04 SEGMENTAL AND SOMATIC DYSFUNCTION OF SACRAL REGION: ICD-10-CM

## 2024-03-28 PROCEDURE — 98941 CHIROPRACT MANJ 3-4 REGIONS: CPT | Performed by: CHIROPRACTOR

## 2024-03-28 NOTE — PROGRESS NOTES
Subjective   Patient ID: Desiree Cochran is a 83 y.o. female who presents March 28, 2024 for chiropractic care.    Medicare    Today, the patient rates their degree of pain as a 3 out of 10 on the numeric pain rating scale.     Desiree returns for continued chiropractic care. Today, she reports that she continues to do well! She states that she has some tension in the neck and in the right hip. The patient denies any changes in health since her last encounter and will follow up as scheduled.    _________________________________________  Initial exam:  Desiree Cochran presents for evaluation and treatment of chronic low back pain, neck pain, and upper back pain. She states that her low back pain symptoms have been present for years and occurs constantly. She states that her symptoms stem from a fall that occurred 30+ years ago. She states that her low back pain has progressively worsened. She states that she has been experiencing numbness and tingling down her right leg to the foot. She states that lifting objects and sitting aggravates her low back symptoms. Desiree also presents with neck pain and upper back pain. She states that she finds it difficult to lay flat. She states that she has noticed decreased range of motion and stiffness. She reports that she has noticed that her neck and upper back symptoms have also progressively worsened. She states that she does experience headaches.     XR Cervical Spine:   FINDINGS:  Reversal normal cervical lordosis is seen.  Osteopenia is present. The prevertebral soft tissues are unremarkable.  Endplate sclerosis and spur formation is seen throughout the cervical  spine. Narrowing of C3-4, C4-5 C5-6 and C6-7 disc space is seen.  Uncovertebral joint hypertrophy seen.  No acute fracture or dislocation is seen.    XR Lumbar Spine:  FINDINGS:  Mild levoscoliosis is noted.  Transitional level is present and will be referred to as L6 in this  dictation. Osteopenia is present.  End-plate  sclerosis and spur formation is seen throughout the lumbar  spine. Facet hypertrophy is identified. Narrowing of L3-4 L5-6 and  L6-S1 disc space is noted. Grade 1 anterolisthesis of L5-L6 is seen.  No acute fracture or dislocation is seen.      Objective   Physical Exam  Neurological:      General: No focal deficit present.      Mental Status: She is alert and oriented to person, place, and time.      Cranial Nerves: No dysarthria or facial asymmetry.      Sensory: Sensation is intact.      Motor: Motor function is intact.      Coordination: Coordination is intact.      Gait: Gait is intact.       Palpation of the following region(s) revealed:  Cervical: Scalenes bilateral, hypertonicity and tenderness.  Upper trapezius bilateral, hypertonicity and tenderness.  Levator scapulae bilateral, hypertonicity and tenderness.  Cervical paraspinals bilateral, hypertonicity and tenderness.  Thoracic: Thoracic paraspinals bilateral, hypertonicity and tenderness.  Middle trapezius bilateral, hypertonicity and tenderness.  Rhomboids bilateral, hypertonicity and tenderness.  Lumbar: Lumbar paraspinals right, hypertonicity and tenderness.  Quadratus lumborum right, hypertonicity and tenderness.  Gluteal right, hypertonicity and tenderness.  Piriformis right, hypertonicity and tenderness.    Segmental Joint(s): Segmental joint dysfunction was assessed with motion palpation and is identified in the following areas:  Cervical : C2 C5 C6  Thoracic : T1, T2., T8, and T10  Lumbopelvic / Sacral SIJ : L1, L5/S1, R SIJ, and L SIJ    Assessment/Plan   Today's Treatment Included: Chiropractic manipulation to the Segmental Joint(s) Cervical : C2 C5 C6 Segmental Joint(s) Lumbopelvic/Sacral SIJ : L1, L5/S1, R SIJ, and L SIJ Segmental Joint(s) Thoracic : T1, T2., T8, and T10   Treatment Techniques Used : Activator/Tool assisted technique and Pelvic blocking technique  Pin and stretch  IASTM    Soft-tissue mobilization was performed in the  following areas:   Cervical paraspinal mm. bilateral, Scalenes bilateral, Upper Trapezius bilateral, and Levator Scap. bilateral  Thoracic Paraspinal mm. bilateral, Middle Trapezius bilateral, and Rhomboids bilateral  Lumbar Paraspinal mm. right, Quadratus Lumborum right, Gluteal mm. Glute. Max.  and Glute. Med. right, and Piriformis right  All soft tissue treatments performed while patient was prone, on pelvic blocks.     The patient tolerated today's treatment with little or no additional discomfort and was instructed to contact the office for questions or concerns.

## 2024-04-04 ENCOUNTER — OFFICE VISIT (OUTPATIENT)
Dept: CARDIOLOGY | Facility: HOSPITAL | Age: 84
End: 2024-04-04
Payer: MEDICARE

## 2024-04-04 VITALS
HEART RATE: 91 BPM | SYSTOLIC BLOOD PRESSURE: 177 MMHG | OXYGEN SATURATION: 98 % | WEIGHT: 102.3 LBS | DIASTOLIC BLOOD PRESSURE: 82 MMHG | HEIGHT: 57 IN | BODY MASS INDEX: 22.07 KG/M2

## 2024-04-04 DIAGNOSIS — I35.1 MODERATE AORTIC INSUFFICIENCY: Primary | ICD-10-CM

## 2024-04-04 DIAGNOSIS — I11.9 BENIGN HYPERTENSIVE HEART DISEASE WITHOUT CONGESTIVE HEART FAILURE: ICD-10-CM

## 2024-04-04 DIAGNOSIS — R93.1 ELEVATED CORONARY ARTERY CALCIUM SCORE: ICD-10-CM

## 2024-04-04 PROCEDURE — 1036F TOBACCO NON-USER: CPT | Performed by: HOSPITALIST

## 2024-04-04 PROCEDURE — 3077F SYST BP >= 140 MM HG: CPT | Performed by: HOSPITALIST

## 2024-04-04 PROCEDURE — 1160F RVW MEDS BY RX/DR IN RCRD: CPT | Performed by: HOSPITALIST

## 2024-04-04 PROCEDURE — 1159F MED LIST DOCD IN RCRD: CPT | Performed by: HOSPITALIST

## 2024-04-04 PROCEDURE — 99204 OFFICE O/P NEW MOD 45 MIN: CPT | Performed by: HOSPITALIST

## 2024-04-04 PROCEDURE — 99214 OFFICE O/P EST MOD 30 MIN: CPT | Performed by: HOSPITALIST

## 2024-04-04 PROCEDURE — 3079F DIAST BP 80-89 MM HG: CPT | Performed by: HOSPITALIST

## 2024-04-04 ASSESSMENT — COLUMBIA-SUICIDE SEVERITY RATING SCALE - C-SSRS
1. IN THE PAST MONTH, HAVE YOU WISHED YOU WERE DEAD OR WISHED YOU COULD GO TO SLEEP AND NOT WAKE UP?: NO
2. HAVE YOU ACTUALLY HAD ANY THOUGHTS OF KILLING YOURSELF?: NO
6. HAVE YOU EVER DONE ANYTHING, STARTED TO DO ANYTHING, OR PREPARED TO DO ANYTHING TO END YOUR LIFE?: NO

## 2024-04-04 NOTE — PROGRESS NOTES
Subjective   Desiree Cochran is a 83 y.o. female with with PMH of HTN, HLD, and mild to moderate AI, who is here to establish cardiac care.  Patient is physically active for her age, she walks total of 1 mile inside her house without any breaks every single day, she also uses the stairs to her basement few times a week.  She denies any chest pain or short of breath with these activities.  She may feel fatigued after walking 1 mile.  No orthopnea, PND, dizziness, or palpitation.  She never smoked.  Her blood pressure is elevated today, show she is known to have whitecoat syndrome, but her home BP measurements average around 110-120/70-80 mmHg per her log.  Patient is on Simvastatin 10, Amlodipine 5 mg, and Losartan 100 mg.  Her last LDL 98.    EKG 8/1/2023: EKG shows normal sinus rhythm, no ST segment abnormalities     Coronary calcium score 6/24/2020:  Coronary artery calcium score of 89*. LAD 84, LCX 5.    TTE 6/24/2020:   1. The left ventricular systolic function is normal with a 70-75% estimated ejection fraction.   2. There is no evidence of left ventricular hypertrophy.   3. Slightly elevated RVSP.   4. There is mild to moderate aortic valve regurgitation.    Review of Systems  ROS is negative other than in HPI.      Objective   Physical Exam  General: NAD  HEENT: IEOM, PERRL   Neck: No JVD or carotid bruit  Lungs: CTAB  Heart: RRR, normal S1 and S2, no loud murmurs  Abdomen: Soft, nontender, positive bowel sounds  Extremities: No edema  Neurologic: No FND  Psychiatric: Normal mood and affect    Assessment/Plan   1-mild to moderate AI:  -No shortness of breath or chest pain, she may feel little fatigued after walking 1 mile.  -Last echocardiogram from 2020, she would need serial echocardiograms every 3 years or earlier if any symptoms.  Will order one now.  -Continue good BP control with current medications.    2-HTN:  -See HPI for details, controlled, continue current medications.    3-HLD:  -Last LDL was 98,  continue current simvastatin 10 mg once daily.    4-elevated coronary calcium score:  -Coronary calcium score 6/24/2020 was elevated at 89*; LAD 84, LCX 5.  -Patient is physically active for age without any cardiovascular symptoms.  -No indication for further cardiac testing.  -Continue simvastatin.  No indication for aspirin.    RTC in 1 year.    Silvia Blanco MD

## 2024-04-04 NOTE — PATIENT INSTRUCTIONS
Thank you so much for visiting us today.    Please continue all your medication including simvastatin.    We are going to order an echocardiogram [ultrasound of your heart] to make sure your leaky valve has not progressed.  Will need to get an echocardiogram every 3 years, or earlier if any shortness of breath or other concerning symptoms.    Please continue the good job you are doing exercising and walking.    Will see back in 1 year, please feel free to call us at [phone] if you have any questions.

## 2024-04-11 ENCOUNTER — APPOINTMENT (OUTPATIENT)
Dept: INTEGRATIVE MEDICINE | Facility: CLINIC | Age: 84
End: 2024-04-11
Payer: MEDICARE

## 2024-04-16 ENCOUNTER — HOSPITAL ENCOUNTER (EMERGENCY)
Facility: HOSPITAL | Age: 84
Discharge: HOME | End: 2024-04-16
Attending: STUDENT IN AN ORGANIZED HEALTH CARE EDUCATION/TRAINING PROGRAM
Payer: MEDICARE

## 2024-04-16 ENCOUNTER — APPOINTMENT (OUTPATIENT)
Dept: RADIOLOGY | Facility: HOSPITAL | Age: 84
End: 2024-04-16
Payer: MEDICARE

## 2024-04-16 VITALS
TEMPERATURE: 98.4 F | HEART RATE: 88 BPM | OXYGEN SATURATION: 100 % | SYSTOLIC BLOOD PRESSURE: 139 MMHG | BODY MASS INDEX: 22.01 KG/M2 | RESPIRATION RATE: 17 BRPM | HEIGHT: 57 IN | WEIGHT: 102 LBS | DIASTOLIC BLOOD PRESSURE: 84 MMHG
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
## 2024-04-16 DIAGNOSIS — I15.9 SECONDARY HYPERTENSION: ICD-10-CM

## 2024-04-16 DIAGNOSIS — S46.911A STRAIN OF ACROMIOCLAVICULAR JOINT, RIGHT, INITIAL ENCOUNTER: ICD-10-CM

## 2024-04-16 PROCEDURE — 73060 X-RAY EXAM OF HUMERUS: CPT | Mod: RT

## 2024-04-16 PROCEDURE — 99284 EMERGENCY DEPT VISIT MOD MDM: CPT | Mod: 25

## 2024-04-16 PROCEDURE — 73030 X-RAY EXAM OF SHOULDER: CPT | Mod: RIGHT SIDE | Performed by: RADIOLOGY

## 2024-04-16 PROCEDURE — 73060 X-RAY EXAM OF HUMERUS: CPT | Mod: RIGHT SIDE | Performed by: RADIOLOGY

## 2024-04-16 PROCEDURE — 73030 X-RAY EXAM OF SHOULDER: CPT | Mod: RT

## 2024-04-16 ASSESSMENT — PAIN DESCRIPTION - PAIN TYPE: TYPE: ACUTE PAIN

## 2024-04-16 ASSESSMENT — COLUMBIA-SUICIDE SEVERITY RATING SCALE - C-SSRS
6. HAVE YOU EVER DONE ANYTHING, STARTED TO DO ANYTHING, OR PREPARED TO DO ANYTHING TO END YOUR LIFE?: NO
1. IN THE PAST MONTH, HAVE YOU WISHED YOU WERE DEAD OR WISHED YOU COULD GO TO SLEEP AND NOT WAKE UP?: NO
2. HAVE YOU ACTUALLY HAD ANY THOUGHTS OF KILLING YOURSELF?: NO

## 2024-04-16 ASSESSMENT — PAIN DESCRIPTION - DESCRIPTORS: DESCRIPTORS: ACHING

## 2024-04-16 ASSESSMENT — PAIN - FUNCTIONAL ASSESSMENT: PAIN_FUNCTIONAL_ASSESSMENT: 0-10

## 2024-04-16 ASSESSMENT — PAIN DESCRIPTION - LOCATION: LOCATION: ARM

## 2024-04-16 ASSESSMENT — PAIN SCALES - GENERAL: PAINLEVEL_OUTOF10: 7

## 2024-04-16 ASSESSMENT — PAIN DESCRIPTION - ONSET: ONSET: GRADUAL

## 2024-04-16 ASSESSMENT — PAIN DESCRIPTION - ORIENTATION: ORIENTATION: RIGHT

## 2024-04-16 ASSESSMENT — PAIN DESCRIPTION - FREQUENCY: FREQUENCY: CONSTANT/CONTINUOUS

## 2024-04-16 ASSESSMENT — PAIN DESCRIPTION - PROGRESSION: CLINICAL_PROGRESSION: GRADUALLY WORSENING

## 2024-04-17 NOTE — ED TRIAGE NOTES
The patient was seen and examined in triage.    History of Present Illness: The patient is a 83-year-old female past medical history of hypertension presented to the emergency department after having a mechanical fall landing on the right shoulder she denies any head trauma or loss of consciousness her pain is primarily in the right shoulder.  She reports no numbness weakness tingling or any other muscle aches and pains    Brief Physical Exam:   Exam is limited by the patient sitting in a chair in triage.   Cardiac: Regular rate and rhythm.  2+ radial pulses  Lungs: Clear to auscultation bilaterally.   Abdomen: Soft, nondistended, normoactive bowel sounds, nontender   Neuro: Cranial nerves grossly intact  MSK: No midline tenderness of spine no paraspinal tenderness.  Right humeral head tenderness no tenderness to palpation of elbow wrist or upper arm.  Soft compartments.    Plan: Appropriate labs and diagnostic imaging were ordered.      For the remainder of the patient's workup and ED course, please refer to the main ED provider note. We discussed need for diagnostic testing including laboratory studies and imaging.  We also discussed that patient may be asked to wait in the waiting room while these tests are pending.  They understand that if they choose to leave without having the testing completed or resulted that we cannot rule out acute life threatening illnesses and the risks involved could lead to worsening condition, permanent disability or even death.     Disclaimer: This note was dictated by speech recognition. Minor errors in transcription may be present. Please call if questions.

## 2024-04-17 NOTE — DISCHARGE INSTRUCTIONS
You are found to have elevated blood pressure here in the emergency department I recommend that you follow-up with your primary care doctor soon as possible to have your blood pressure reevaluated and see if you require either a change in medication or be started on medication.    You have a AC joint strain.  Keep your arm in the sling and follow-up with orthopedic surgery.  I have given you an orthopedic surgeon to establish care with as well as with any orthopedic surgeon you like.  If you have any worsening symptoms

## 2024-04-17 NOTE — ED TRIAGE NOTES
Pt came in the ER for a fall. Pt stated the she was walking and turned fast and fell on her side hurting her right shoulder. Pt stated she has vertigo normally. Pt took 325 mg of tylenol at 7 pm and pain has stayed the same.

## 2024-04-17 NOTE — ED PROVIDER NOTES
HPI   Chief Complaint   Patient presents with    Fall    Shoulder Pain       83-year-old female past medical history of hypertension presented to the emergency department for mechanical fall.  She reports no head trauma or loss of consciousness.  She injured her right shoulder.  She denies any numbness weakness tingling or any other muscle aches or pains at this time.                          No data recorded                   Patient History   Past Medical History:   Diagnosis Date    Helicobacter pylori gastrointestinal tract infection     Personal history of peptic ulcer disease 03/30/2017    History of peptic ulcer     Past Surgical History:   Procedure Laterality Date    COLONOSCOPY  12/20/2013    Complete Colonoscopy    TOTAL ABDOMINAL HYSTERECTOMY W/ BILATERAL SALPINGOOPHORECTOMY  12/09/2013    Total Abdominal Hysterectomy With Removal Of Both Ovaries     Family History   Problem Relation Name Age of Onset    Heart failure Mother      Coronary artery disease Sister       Social History     Tobacco Use    Smoking status: Never    Smokeless tobacco: Never   Substance Use Topics    Alcohol use: Never    Drug use: Not on file       Physical Exam   ED Triage Vitals [04/16/24 2023]   Temperature Heart Rate Respirations BP   36.9 °C (98.4 °F) 92 16 164/75      Pulse Ox Temp Source Heart Rate Source Patient Position   100 % Oral Monitor Sitting      BP Location FiO2 (%)     Left arm --       Physical Exam  Vitals reviewed.   Constitutional:       Appearance: Normal appearance.   HENT:      Head: Normocephalic and atraumatic.      Right Ear: Tympanic membrane normal.      Left Ear: Tympanic membrane normal.      Nose: Nose normal.      Mouth/Throat:      Mouth: Mucous membranes are moist.      Pharynx: Oropharynx is clear.   Eyes:      Extraocular Movements: Extraocular movements intact.      Conjunctiva/sclera: Conjunctivae normal.      Pupils: Pupils are equal, round, and reactive to light.   Cardiovascular:       Rate and Rhythm: Normal rate and regular rhythm.      Pulses: Normal pulses.      Heart sounds: Normal heart sounds.   Pulmonary:      Effort: Pulmonary effort is normal.      Breath sounds: Normal breath sounds.   Abdominal:      General: Abdomen is flat. Bowel sounds are normal.      Palpations: Abdomen is soft.   Musculoskeletal:      Cervical back: Normal and neck supple. No tenderness.      Thoracic back: Normal. No tenderness.      Lumbar back: Normal. No tenderness.      Comments: No midline tenderness of spine no paraspinal tenderness right AC joint tenderness full range of motion soft compartments.  Mild tenderness over humeral head with no crepitus.   Skin:     General: Skin is warm and dry.      Capillary Refill: Capillary refill takes less than 2 seconds.   Neurological:      General: No focal deficit present.      Mental Status: She is alert and oriented to person, place, and time. Mental status is at baseline.         ED Course & MDM   ED Course as of 04/16/24 2140   Tue Apr 16, 2024 2035 83-year-old female history of hypertension presenting after mechanical fall.  On examination vital signs are stable 2+ peripheral pulses abdomen nontender.  Some tenderness over humeral head/AC joint.  Range of motion intact soft compartments 2+ peripheral pulses no tenderness to palpation of spine.  No paraspinal tenderness equal breath sounds bilaterally 2+ peripheral pulses cranial nerves grossly intact.  Differential diagnosis includes fracture, dislocation, subluxation pathology.  X-rays have been ordered. [ZS]   2140 X-rays on my interpretation showed no acute traumatic injuries however patient has localized pain over AC joint thus concern for strain.  Patient was placed in sling and given orthopedic follow-up. [ZS]      ED Course User Index  [ZS] Mini Plasencia MD         Diagnoses as of 04/16/24 2140   Fall, initial encounter   Strain of acromioclavicular joint, right, initial encounter   Secondary  hypertension       Medical Decision Making      Procedure  Procedures     Mini Plasencia MD  04/16/24 [id number]

## 2024-04-18 ENCOUNTER — HOSPITAL ENCOUNTER (OUTPATIENT)
Dept: CARDIOLOGY | Facility: HOSPITAL | Age: 84
Discharge: HOME | End: 2024-04-18
Payer: MEDICARE

## 2024-04-18 VITALS
BODY MASS INDEX: 20.56 KG/M2 | WEIGHT: 102 LBS | HEIGHT: 59 IN | DIASTOLIC BLOOD PRESSURE: 84 MMHG | SYSTOLIC BLOOD PRESSURE: 139 MMHG

## 2024-04-18 DIAGNOSIS — E78.5 DYSLIPIDEMIA: Primary | ICD-10-CM

## 2024-04-18 DIAGNOSIS — I35.1 MODERATE AORTIC INSUFFICIENCY: ICD-10-CM

## 2024-04-18 LAB
AORTIC VALVE MEAN GRADIENT: 6 MMHG
AORTIC VALVE PEAK VELOCITY: 1.77 M/S
AV PEAK GRADIENT: 12.5 MMHG
AVA (PEAK VEL): 1.88 CM2
AVA (VTI): 1.91 CM2
EJECTION FRACTION APICAL 4 CHAMBER: 73.8
LEFT ATRIUM VOLUME AREA LENGTH INDEX BSA: 26.7 ML/M2
LEFT VENTRICLE INTERNAL DIMENSION DIASTOLE: 3.5 CM (ref 3.5–6)
LEFT VENTRICULAR OUTFLOW TRACT DIAMETER: 1.8 CM
LV EJECTION FRACTION BIPLANE: 75 %
MITRAL VALVE E/A RATIO: 0.83
MITRAL VALVE E/E' RATIO: 6.79
RIGHT VENTRICLE FREE WALL PEAK S': 12.9 CM/S
RIGHT VENTRICLE PEAK SYSTOLIC PRESSURE: 30 MMHG
TRICUSPID ANNULAR PLANE SYSTOLIC EXCURSION: 2.1 CM

## 2024-04-18 PROCEDURE — 93306 TTE W/DOPPLER COMPLETE: CPT

## 2024-04-18 PROCEDURE — 93306 TTE W/DOPPLER COMPLETE: CPT | Performed by: INTERNAL MEDICINE

## 2024-04-18 RX ORDER — SIMVASTATIN 10 MG/1
10 TABLET, FILM COATED ORAL NIGHTLY
Qty: 90 TABLET | Refills: 1 | Status: SHIPPED | OUTPATIENT
Start: 2024-04-18

## 2024-04-19 PROBLEM — N95.2 ATROPHIC VAGINITIS: Status: ACTIVE | Noted: 2023-03-02

## 2024-04-19 PROBLEM — R10.13 ACUTE EPIGASTRIC PAIN: Status: RESOLVED | Noted: 2024-04-19 | Resolved: 2024-04-19

## 2024-04-19 PROBLEM — K52.9 INFLAMMATION OF STOMACH AND INTESTINE: Status: ACTIVE | Noted: 2024-04-19

## 2024-04-19 PROBLEM — I11.9 BENIGN HYPERTENSIVE HEART DISEASE WITHOUT CONGESTIVE HEART FAILURE: Status: ACTIVE | Noted: 2024-04-19

## 2024-04-19 PROBLEM — J34.3 HYPERTROPHY OF NASAL TURBINATES: Status: ACTIVE | Noted: 2024-04-19

## 2024-04-19 PROBLEM — I35.1 AORTIC VALVE REGURGITATION: Status: ACTIVE | Noted: 2023-03-02

## 2024-04-19 PROBLEM — N90.89 LESION OF VULVA: Status: ACTIVE | Noted: 2023-03-02

## 2024-04-19 PROBLEM — M19.019 OSTEOARTHRITIS OF SHOULDER: Status: ACTIVE | Noted: 2023-03-02

## 2024-04-19 PROBLEM — H91.90 HEARING LOSS: Status: ACTIVE | Noted: 2024-04-19

## 2024-04-19 PROBLEM — Z86.19 HISTORY OF BACTERIAL INFECTION: Status: ACTIVE | Noted: 2024-04-19

## 2024-04-19 PROBLEM — R17 INCREASED BILIRUBIN LEVEL: Status: ACTIVE | Noted: 2023-03-02

## 2024-04-19 PROBLEM — H93.8X2 SENSATION OF PLUGGED EAR ON LEFT SIDE: Status: ACTIVE | Noted: 2024-04-19

## 2024-04-19 PROBLEM — R21 RASH: Status: ACTIVE | Noted: 2024-04-19

## 2024-04-19 PROBLEM — H93.A2 SUBJECTIVE PULSATILE TINNITUS OF LEFT EAR: Status: ACTIVE | Noted: 2024-04-19

## 2024-04-19 PROBLEM — I87.2 PERIPHERAL VENOUS INSUFFICIENCY: Status: ACTIVE | Noted: 2021-01-14

## 2024-04-19 PROBLEM — F43.21 GRIEF: Status: ACTIVE | Noted: 2024-04-19

## 2024-04-19 PROBLEM — R10.13 EPIGASTRIC PAIN: Status: ACTIVE | Noted: 2023-03-30

## 2024-04-19 PROBLEM — Z78.0 POSTMENOPAUSAL STATE: Status: ACTIVE | Noted: 2024-04-19

## 2024-04-19 PROBLEM — I25.10 CALCIFICATION OF CORONARY ARTERY: Status: ACTIVE | Noted: 2023-03-02

## 2024-04-19 PROBLEM — J34.89 NASAL DISCHARGE: Status: ACTIVE | Noted: 2023-10-26

## 2024-04-19 PROBLEM — H81.10 BENIGN PAROXYSMAL POSITIONAL VERTIGO: Status: ACTIVE | Noted: 2023-03-02

## 2024-04-19 PROBLEM — H93.13 SUBJECTIVE TINNITUS OF BOTH EARS: Status: ACTIVE | Noted: 2023-03-02

## 2024-04-19 PROBLEM — R19.7 DIARRHEA: Status: ACTIVE | Noted: 2023-03-30

## 2024-04-19 PROBLEM — L98.9 SKIN LESION: Status: ACTIVE | Noted: 2023-03-02

## 2024-04-19 PROBLEM — H61.21 IMPACTED CERUMEN OF RIGHT EAR: Status: ACTIVE | Noted: 2024-04-19

## 2024-04-19 PROBLEM — N76.0 VULVOVAGINITIS: Status: ACTIVE | Noted: 2023-03-02

## 2024-04-19 PROBLEM — I25.84 CALCIFICATION OF CORONARY ARTERY: Status: ACTIVE | Noted: 2023-03-02

## 2024-04-19 RX ORDER — CLINDAMYCIN HYDROCHLORIDE 300 MG/1
300 CAPSULE ORAL
COMMUNITY

## 2024-04-22 ENCOUNTER — OFFICE VISIT (OUTPATIENT)
Dept: ORTHOPEDIC SURGERY | Facility: CLINIC | Age: 84
End: 2024-04-22
Payer: MEDICARE

## 2024-04-22 VITALS — BODY MASS INDEX: 22.01 KG/M2 | HEIGHT: 57 IN | WEIGHT: 102 LBS

## 2024-04-22 DIAGNOSIS — S46.011A ROTATOR CUFF STRAIN, RIGHT, INITIAL ENCOUNTER: ICD-10-CM

## 2024-04-22 DIAGNOSIS — M19.011 ARTHRITIS OF RIGHT GLENOHUMERAL JOINT: ICD-10-CM

## 2024-04-22 DIAGNOSIS — Z91.81 HISTORY OF RECENT FALL: Primary | ICD-10-CM

## 2024-04-22 PROCEDURE — 2500000004 HC RX 250 GENERAL PHARMACY W/ HCPCS (ALT 636 FOR OP/ED): Performed by: ORTHOPAEDIC SURGERY

## 2024-04-22 PROCEDURE — 1160F RVW MEDS BY RX/DR IN RCRD: CPT | Performed by: ORTHOPAEDIC SURGERY

## 2024-04-22 PROCEDURE — 1036F TOBACCO NON-USER: CPT | Performed by: ORTHOPAEDIC SURGERY

## 2024-04-22 PROCEDURE — 99204 OFFICE O/P NEW MOD 45 MIN: CPT | Performed by: ORTHOPAEDIC SURGERY

## 2024-04-22 PROCEDURE — 1159F MED LIST DOCD IN RCRD: CPT | Performed by: ORTHOPAEDIC SURGERY

## 2024-04-22 PROCEDURE — 20610 DRAIN/INJ JOINT/BURSA W/O US: CPT | Performed by: ORTHOPAEDIC SURGERY

## 2024-04-22 PROCEDURE — 99214 OFFICE O/P EST MOD 30 MIN: CPT | Performed by: ORTHOPAEDIC SURGERY

## 2024-04-22 PROCEDURE — 2500000005 HC RX 250 GENERAL PHARMACY W/O HCPCS: Performed by: ORTHOPAEDIC SURGERY

## 2024-04-22 PROCEDURE — 1125F AMNT PAIN NOTED PAIN PRSNT: CPT | Performed by: ORTHOPAEDIC SURGERY

## 2024-04-22 RX ORDER — TRIAMCINOLONE ACETONIDE 40 MG/ML
40 INJECTION, SUSPENSION INTRA-ARTICULAR; INTRAMUSCULAR
Status: COMPLETED | OUTPATIENT
Start: 2024-04-22 | End: 2024-04-22

## 2024-04-22 RX ORDER — LIDOCAINE HYDROCHLORIDE 10 MG/ML
4 INJECTION INFILTRATION; PERINEURAL
Status: COMPLETED | OUTPATIENT
Start: 2024-04-22 | End: 2024-04-22

## 2024-04-22 RX ADMIN — TRIAMCINOLONE ACETONIDE 40 MG: 40 INJECTION, SUSPENSION INTRA-ARTICULAR; INTRAMUSCULAR at 17:10

## 2024-04-22 RX ADMIN — LIDOCAINE HYDROCHLORIDE 4 ML: 10 INJECTION, SOLUTION INFILTRATION; PERINEURAL at 17:10

## 2024-04-22 ASSESSMENT — PAIN SCALES - GENERAL: PAINLEVEL_OUTOF10: 4

## 2024-04-22 ASSESSMENT — PAIN - FUNCTIONAL ASSESSMENT: PAIN_FUNCTIONAL_ASSESSMENT: 0-10

## 2024-04-22 NOTE — PROGRESS NOTES
ORTHOPAEDIC HISTORY AND PHYSICAL    History Of Present Illness  Orthopaedic Problems/Injuries:  Desiree Cochran is a 83 y.o. female presenting complaining of right shoulder pain.  Patient was here with her daughter.  She reported that she fell last Tuesday on her right shoulder.  She had pain immediately and had difficulty moving her arm.  However her pain has progressively improved.  She is not able to perform some range of motion.  She describes the pain as sharp.  The pain is nonradiating.  No numbness or tingling in the hand.      Review of Systems: 12 point ROS negative unless stated in HPI    Past Medical History  She has a past medical history of Acute epigastric pain (04/19/2024), Helicobacter pylori gastrointestinal tract infection, and Personal history of peptic ulcer disease (03/30/2017).    Surgical History  She has a past surgical history that includes Total abdominal hysterectomy w/ bilateral salpingoophorectomy (12/09/2013) and Colonoscopy (12/20/2013).     Social History  She reports that she has never smoked. She has never used smokeless tobacco. She reports that she does not drink alcohol and does not use drugs.    Family History  Family History   Problem Relation Name Age of Onset    Heart failure Mother      Coronary artery disease Sister          Allergies  Ace inhibitors, Erythromycin, Hydrochlorothiazide, Penicillins, and Sulfa (sulfonamide antibiotics)    Review of Systems     Physical Exam    Gen: The patient is alert and oriented ×3, is in no acute distress, and appear their stated age and weight.    Psychiatric: Mood and affect are appropriate.    Eyes: Sclera are white, and pupils are round and symmetric.    ENT: Mucous membranes are moist.     Neck: Supple. Thyroid is midline.    Respiratory: Respirations are nonlabored, chest rise is symmetric.    Cardiac: Rate is regular by palpation of distal pulses.     Abdomen: Nondistended.    Integument: No obvious cutaneous lesions are noted. No  signs of lymphangitis. No signs of systemic edema.    Examination of the right shoulder reveals no atrophy is symmetric.  There is tenderness to palpation of the shoulder.  No tenderness to palpation over the AC joint of the clavicle.  Patient is able to initiate shoulder forward flexion and abduction.  This is limited by pain.  However with passive forward flexion and abduction to about 90 degrees patient was able to maintain this position.  No numbness or tingling the hand.        Relevant Results  I personally reviewed her right shoulder radiograph that reveals no fracture or dislocation.  Patient has degenerative changes of the glenohumeral joint and the AC joint.    Assessment/Plan   Patient presented with right rotator cuff strain and contusion of the shoulder after a fall.  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  I recommended conservative treatment.  I referred her to physical therapy range of motion strengthening exercises.  At this point given the present of rotator cuff strain I do not believe additional imaging is needed.  Due to the severity of her pain I offered her intra-articular subacromial shoulder injection.  She was interested.  Injection was performed and she tolerated well.  She will follow-up with me in the next couple of months if she does not achieve full recovery with physical therapy.  She may take over-the-counter medication as needed for pain.    Patient ID: Desiree Cochran is a 83 y.o. female.    L Inj/Asp: R subacromial bursa on 4/22/2024 5:10 PM  Indications: pain  Details: 21 G needle, lateral approach  Medications: 40 mg triamcinolone acetonide 40 mg/mL; 4 mL lidocaine 10 mg/mL (1 %)  Procedure, treatment alternatives, risks and benefits explained, specific risks discussed. Consent was given by the patient. Immediately prior to procedure a time out was called to verify the correct patient, procedure, equipment, support staff and site/side  marked as required. Patient was prepped and draped in the usual sterile fashion.

## 2024-04-25 ENCOUNTER — ALLIED HEALTH (OUTPATIENT)
Dept: INTEGRATIVE MEDICINE | Facility: CLINIC | Age: 84
End: 2024-04-25
Payer: MEDICARE

## 2024-04-25 DIAGNOSIS — M54.2 CERVICALGIA: ICD-10-CM

## 2024-04-25 DIAGNOSIS — M99.02 SEGMENTAL AND SOMATIC DYSFUNCTION OF THORACIC REGION: ICD-10-CM

## 2024-04-25 DIAGNOSIS — M54.17 LUMBOSACRAL RADICULOPATHY: ICD-10-CM

## 2024-04-25 DIAGNOSIS — M53.9 MULTILEVEL DEGENERATIVE DISC DISEASE: ICD-10-CM

## 2024-04-25 DIAGNOSIS — M99.01 SEGMENTAL AND SOMATIC DYSFUNCTION OF CERVICAL REGION: ICD-10-CM

## 2024-04-25 DIAGNOSIS — M79.10 MYALGIA: ICD-10-CM

## 2024-04-25 DIAGNOSIS — M99.03 SEGMENTAL AND SOMATIC DYSFUNCTION OF LUMBAR REGION: Primary | ICD-10-CM

## 2024-04-25 DIAGNOSIS — M99.04 SEGMENTAL AND SOMATIC DYSFUNCTION OF SACRAL REGION: ICD-10-CM

## 2024-04-25 PROCEDURE — 98941 CHIROPRACT MANJ 3-4 REGIONS: CPT | Performed by: CHIROPRACTOR

## 2024-04-25 NOTE — PROGRESS NOTES
Subjective   Patient ID: Desiree Cochran is a 83 y.o. female who presents April 25, 2024 for chiropractic care.    Medicare    Today, the patient rates their degree of pain as a 3 out of 10 on the numeric pain rating scale.     Desiree returns for continued chiropractic care. Today, she presents with mild low back and hip pain. She also reports that her shoulder has been feeling tight as well. The patient denies any changes in health since her last encounter and will follow up as scheduled.    _________________________________________  Initial exam:  Desiree Cochran presents for evaluation and treatment of chronic low back pain, neck pain, and upper back pain. She states that her low back pain symptoms have been present for years and occurs constantly. She states that her symptoms stem from a fall that occurred 30+ years ago. She states that her low back pain has progressively worsened. She states that she has been experiencing numbness and tingling down her right leg to the foot. She states that lifting objects and sitting aggravates her low back symptoms. Desiree also presents with neck pain and upper back pain. She states that she finds it difficult to lay flat. She states that she has noticed decreased range of motion and stiffness. She reports that she has noticed that her neck and upper back symptoms have also progressively worsened. She states that she does experience headaches.     XR Cervical Spine:   FINDINGS:  Reversal normal cervical lordosis is seen.  Osteopenia is present. The prevertebral soft tissues are unremarkable.  Endplate sclerosis and spur formation is seen throughout the cervical  spine. Narrowing of C3-4, C4-5 C5-6 and C6-7 disc space is seen.  Uncovertebral joint hypertrophy seen.  No acute fracture or dislocation is seen.    XR Lumbar Spine:  FINDINGS:  Mild levoscoliosis is noted.  Transitional level is present and will be referred to as L6 in this  dictation. Osteopenia is present.  End-plate  sclerosis and spur formation is seen throughout the lumbar  spine. Facet hypertrophy is identified. Narrowing of L3-4 L5-6 and  L6-S1 disc space is noted. Grade 1 anterolisthesis of L5-L6 is seen.  No acute fracture or dislocation is seen.      Objective   Physical Exam  Neurological:      General: No focal deficit present.      Mental Status: She is alert and oriented to person, place, and time.      Cranial Nerves: No dysarthria or facial asymmetry.      Sensory: Sensation is intact.      Motor: Motor function is intact.      Coordination: Coordination is intact.      Gait: Gait is intact.       Palpation of the following region(s) revealed:  Cervical: Scalenes bilateral, hypertonicity and tenderness.  Upper trapezius bilateral, hypertonicity and tenderness.  Levator scapulae bilateral, hypertonicity and tenderness.  Cervical paraspinals bilateral, hypertonicity and tenderness.  Thoracic: Thoracic paraspinals bilateral, hypertonicity and tenderness.  Middle trapezius bilateral, hypertonicity and tenderness.  Rhomboids bilateral, hypertonicity and tenderness.  Lumbar: Lumbar paraspinals right, hypertonicity and tenderness.  Quadratus lumborum right, hypertonicity and tenderness.  Gluteal right, hypertonicity and tenderness.  Piriformis right, hypertonicity and tenderness.    Segmental Joint(s): Segmental joint dysfunction was assessed with motion palpation and is identified in the following areas:  Cervical : C2 C5 C6  Thoracic : T1, T2., T8, and T10  Lumbopelvic / Sacral SIJ : L1, L5/S1, R SIJ, and L SIJ    Assessment/Plan   Today's Treatment Included: Chiropractic manipulation to the Segmental Joint(s) Cervical : C2 C5 C6 Segmental Joint(s) Lumbopelvic/Sacral SIJ : L1, L5/S1, R SIJ, and L SIJ Segmental Joint(s) Thoracic : T1, T2., T8, and T10   Treatment Techniques Used : Activator/Tool assisted technique and Pelvic blocking technique  Pin and stretch    Soft-tissue mobilization was performed in the following  areas:   Cervical paraspinal mm. bilateral, Scalenes bilateral, Upper Trapezius bilateral, and Levator Scap. bilateral  Thoracic Paraspinal mm. bilateral, Middle Trapezius bilateral, and Rhomboids bilateral  Lumbar Paraspinal mm. right, Quadratus Lumborum right, Gluteal mm. Glute. Max.  and Glute. Med. right, and Piriformis right  All soft tissue treatments performed while patient was prone, on pelvic blocks.     The patient tolerated today's treatment with little or no additional discomfort and was instructed to contact the office for questions or concerns.

## 2024-05-09 ENCOUNTER — ALLIED HEALTH (OUTPATIENT)
Dept: INTEGRATIVE MEDICINE | Facility: CLINIC | Age: 84
End: 2024-05-09
Payer: MEDICARE

## 2024-05-09 DIAGNOSIS — M99.01 SEGMENTAL AND SOMATIC DYSFUNCTION OF CERVICAL REGION: ICD-10-CM

## 2024-05-09 DIAGNOSIS — M99.02 SEGMENTAL AND SOMATIC DYSFUNCTION OF THORACIC REGION: ICD-10-CM

## 2024-05-09 DIAGNOSIS — M99.04 SEGMENTAL AND SOMATIC DYSFUNCTION OF SACRAL REGION: ICD-10-CM

## 2024-05-09 DIAGNOSIS — M53.9 MULTILEVEL DEGENERATIVE DISC DISEASE: ICD-10-CM

## 2024-05-09 DIAGNOSIS — M54.2 CERVICALGIA: ICD-10-CM

## 2024-05-09 DIAGNOSIS — M79.10 MYALGIA: ICD-10-CM

## 2024-05-09 DIAGNOSIS — M54.17 LUMBOSACRAL RADICULOPATHY: ICD-10-CM

## 2024-05-09 DIAGNOSIS — M99.03 SEGMENTAL AND SOMATIC DYSFUNCTION OF LUMBAR REGION: Primary | ICD-10-CM

## 2024-05-09 PROCEDURE — 98941 CHIROPRACT MANJ 3-4 REGIONS: CPT | Performed by: CHIROPRACTOR

## 2024-05-09 NOTE — PROGRESS NOTES
Subjective   Patient ID: Desiree Cochran is a 83 y.o. female who presents May 9, 2024 for chiropractic care.    Medicare    Today, the patient rates their degree of pain as a 3 out of 10 on the numeric pain rating scale.     Desiree returns for continued chiropractic care. Today, she denies any significant increase in hip and low back discomfort. She states that she occasionally will experience some pain. The patient denies any changes in health since her last encounter and will follow up as scheduled.    _________________________________________  Initial exam:  Desiree Cochran presents for evaluation and treatment of chronic low back pain, neck pain, and upper back pain. She states that her low back pain symptoms have been present for years and occurs constantly. She states that her symptoms stem from a fall that occurred 30+ years ago. She states that her low back pain has progressively worsened. She states that she has been experiencing numbness and tingling down her right leg to the foot. She states that lifting objects and sitting aggravates her low back symptoms. Desiree also presents with neck pain and upper back pain. She states that she finds it difficult to lay flat. She states that she has noticed decreased range of motion and stiffness. She reports that she has noticed that her neck and upper back symptoms have also progressively worsened. She states that she does experience headaches.     XR Cervical Spine:   FINDINGS:  Reversal normal cervical lordosis is seen.  Osteopenia is present. The prevertebral soft tissues are unremarkable.  Endplate sclerosis and spur formation is seen throughout the cervical  spine. Narrowing of C3-4, C4-5 C5-6 and C6-7 disc space is seen.  Uncovertebral joint hypertrophy seen.  No acute fracture or dislocation is seen.    XR Lumbar Spine:  FINDINGS:  Mild levoscoliosis is noted.  Transitional level is present and will be referred to as L6 in this  dictation. Osteopenia is  present.  End-plate sclerosis and spur formation is seen throughout the lumbar  spine. Facet hypertrophy is identified. Narrowing of L3-4 L5-6 and  L6-S1 disc space is noted. Grade 1 anterolisthesis of L5-L6 is seen.  No acute fracture or dislocation is seen.      Objective   Physical Exam  Neurological:      General: No focal deficit present.      Mental Status: She is alert and oriented to person, place, and time.      Cranial Nerves: No dysarthria or facial asymmetry.      Sensory: Sensation is intact.      Motor: Motor function is intact.      Coordination: Coordination is intact.      Gait: Gait is intact.       Palpation of the following region(s) revealed:  Cervical: Scalenes bilateral, hypertonicity and tenderness.  Upper trapezius bilateral, hypertonicity and tenderness.  Levator scapulae bilateral, hypertonicity and tenderness.  Cervical paraspinals bilateral, hypertonicity and tenderness.  Thoracic: Thoracic paraspinals bilateral, hypertonicity and tenderness.  Middle trapezius bilateral, hypertonicity and tenderness.  Rhomboids bilateral, hypertonicity and tenderness.  Lumbar: Lumbar paraspinals right, hypertonicity and tenderness.  Quadratus lumborum right, hypertonicity and tenderness.  Gluteal right, hypertonicity and tenderness.  Piriformis right, hypertonicity and tenderness.    Segmental Joint(s): Segmental joint dysfunction was assessed with motion palpation and is identified in the following areas:  Cervical : C2 C5 C6  Thoracic : T1, T2., T8, and T10  Lumbopelvic / Sacral SIJ : L1, L5/S1, R SIJ, and L SIJ    Assessment/Plan   Today's Treatment Included: Chiropractic manipulation to the Segmental Joint(s) Cervical : C2 C5 C6 Segmental Joint(s) Lumbopelvic/Sacral SIJ : L1, L5/S1, R SIJ, and L SIJ Segmental Joint(s) Thoracic : T1, T2., T8, and T10   Treatment Techniques Used : Activator/Tool assisted technique and Pelvic blocking technique  Pin and stretch  IASTM    Soft-tissue mobilization was  performed in the following areas:   Cervical paraspinal mm. bilateral, Scalenes bilateral, Upper Trapezius bilateral, and Levator Scap. bilateral  Thoracic Paraspinal mm. bilateral, Middle Trapezius bilateral, and Rhomboids bilateral  Lumbar Paraspinal mm. right, Quadratus Lumborum right, Gluteal mm. Glute. Max.  and Glute. Med. right, and Piriformis right  All soft tissue treatments performed while patient was prone, on pelvic blocks.     The patient tolerated today's treatment with little or no additional discomfort and was instructed to contact the office for questions or concerns.

## 2024-05-20 ENCOUNTER — APPOINTMENT (OUTPATIENT)
Dept: PHYSICAL THERAPY | Facility: CLINIC | Age: 84
End: 2024-05-20
Payer: MEDICARE

## 2024-05-23 ENCOUNTER — ALLIED HEALTH (OUTPATIENT)
Dept: INTEGRATIVE MEDICINE | Facility: CLINIC | Age: 84
End: 2024-05-23
Payer: MEDICARE

## 2024-05-23 DIAGNOSIS — M99.02 SEGMENTAL AND SOMATIC DYSFUNCTION OF THORACIC REGION: ICD-10-CM

## 2024-05-23 DIAGNOSIS — M99.03 SEGMENTAL AND SOMATIC DYSFUNCTION OF LUMBAR REGION: Primary | ICD-10-CM

## 2024-05-23 DIAGNOSIS — M54.17 LUMBOSACRAL RADICULOPATHY: ICD-10-CM

## 2024-05-23 DIAGNOSIS — M79.10 MYALGIA: ICD-10-CM

## 2024-05-23 DIAGNOSIS — M99.04 SEGMENTAL AND SOMATIC DYSFUNCTION OF SACRAL REGION: ICD-10-CM

## 2024-05-23 DIAGNOSIS — M53.9 MULTILEVEL DEGENERATIVE DISC DISEASE: ICD-10-CM

## 2024-05-23 PROCEDURE — 98941 CHIROPRACT MANJ 3-4 REGIONS: CPT | Performed by: CHIROPRACTOR

## 2024-05-23 NOTE — PROGRESS NOTES
Subjective   Patient ID: Desiree Cochran is a 83 y.o. female who presents May 23, 2024 for chiropractic care.    Medicare    Today, the patient rates their degree of pain as a 3 out of 10 on the numeric pain rating scale.     Desiree returns for continued chiropractic care. Today, she states that her hip and low back have been bothering her. She states that her symptoms typically present as episodes. The patient denies any changes in health since her last encounter and will follow up as scheduled.    _________________________________________  Initial exam:  Desiree Cochran presents for evaluation and treatment of chronic low back pain, neck pain, and upper back pain. She states that her low back pain symptoms have been present for years and occurs constantly. She states that her symptoms stem from a fall that occurred 30+ years ago. She states that her low back pain has progressively worsened. She states that she has been experiencing numbness and tingling down her right leg to the foot. She states that lifting objects and sitting aggravates her low back symptoms. Desiree also presents with neck pain and upper back pain. She states that she finds it difficult to lay flat. She states that she has noticed decreased range of motion and stiffness. She reports that she has noticed that her neck and upper back symptoms have also progressively worsened. She states that she does experience headaches.     XR Cervical Spine:   FINDINGS:  Reversal normal cervical lordosis is seen.  Osteopenia is present. The prevertebral soft tissues are unremarkable.  Endplate sclerosis and spur formation is seen throughout the cervical  spine. Narrowing of C3-4, C4-5 C5-6 and C6-7 disc space is seen.  Uncovertebral joint hypertrophy seen.  No acute fracture or dislocation is seen.    XR Lumbar Spine:  FINDINGS:  Mild levoscoliosis is noted.  Transitional level is present and will be referred to as L6 in this  dictation. Osteopenia is present.  End-plate  sclerosis and spur formation is seen throughout the lumbar  spine. Facet hypertrophy is identified. Narrowing of L3-4 L5-6 and  L6-S1 disc space is noted. Grade 1 anterolisthesis of L5-L6 is seen.  No acute fracture or dislocation is seen.      Objective   Physical Exam  Neurological:      General: No focal deficit present.      Mental Status: She is alert and oriented to person, place, and time.      Cranial Nerves: No dysarthria or facial asymmetry.      Sensory: Sensation is intact.      Motor: Motor function is intact.      Coordination: Coordination is intact.      Gait: Gait is intact.       Palpation of the following region(s) revealed:  Cervical: Scalenes bilateral, hypertonicity and tenderness.  Upper trapezius bilateral, hypertonicity and tenderness.  Levator scapulae bilateral, hypertonicity and tenderness.  Cervical paraspinals bilateral, hypertonicity and tenderness.  Thoracic: Thoracic paraspinals bilateral, hypertonicity and tenderness.  Middle trapezius bilateral, hypertonicity and tenderness.  Rhomboids bilateral, hypertonicity and tenderness.  Lumbar: Lumbar paraspinals right, hypertonicity and tenderness.  Quadratus lumborum right, hypertonicity and tenderness.  Gluteal right, hypertonicity and tenderness.  Piriformis right, hypertonicity and tenderness.    Segmental Joint(s): Segmental joint dysfunction was assessed with motion palpation and is identified in the following areas:  Thoracic : T1, T2., T8, and T10  Lumbopelvic / Sacral SIJ : L1, L5/S1, R SIJ, and L SIJ    Assessment/Plan   Today's Treatment Included: Chiropractic manipulation to the  Segmental Joint(s) Lumbopelvic/Sacral SIJ : L1, L5/S1, R SIJ, and L SIJ Segmental Joint(s) Thoracic : T1, T2., T8, and T10   Treatment Techniques Used : Activator/Tool assisted technique and Pelvic blocking technique  Pin and stretch    Soft-tissue mobilization was performed in the following areas:   Cervical paraspinal mm. bilateral, Scalenes  bilateral, Upper Trapezius bilateral, and Levator Scap. bilateral  Thoracic Paraspinal mm. bilateral, Middle Trapezius bilateral, and Rhomboids bilateral  Lumbar Paraspinal mm. right, Quadratus Lumborum right, Gluteal mm. Glute. Max.  and Glute. Med. right, and Piriformis right  All soft tissue treatments performed while patient was prone, on pelvic blocks.     The patient tolerated today's treatment with little or no additional discomfort and was instructed to contact the office for questions or concerns.

## 2024-05-29 DIAGNOSIS — I10 PRIMARY HYPERTENSION: Primary | ICD-10-CM

## 2024-05-29 RX ORDER — AMLODIPINE BESYLATE 5 MG/1
5 TABLET ORAL DAILY
Qty: 90 TABLET | Refills: 2 | Status: SHIPPED | OUTPATIENT
Start: 2024-05-29

## 2024-06-06 ENCOUNTER — ALLIED HEALTH (OUTPATIENT)
Dept: INTEGRATIVE MEDICINE | Facility: CLINIC | Age: 84
End: 2024-06-06
Payer: MEDICARE

## 2024-06-06 DIAGNOSIS — M99.03 SEGMENTAL AND SOMATIC DYSFUNCTION OF LUMBAR REGION: Primary | ICD-10-CM

## 2024-06-06 DIAGNOSIS — M54.17 LUMBOSACRAL RADICULOPATHY: ICD-10-CM

## 2024-06-06 DIAGNOSIS — M79.10 MYALGIA: ICD-10-CM

## 2024-06-06 DIAGNOSIS — M99.04 SEGMENTAL AND SOMATIC DYSFUNCTION OF SACRAL REGION: ICD-10-CM

## 2024-06-06 DIAGNOSIS — M99.02 SEGMENTAL AND SOMATIC DYSFUNCTION OF THORACIC REGION: ICD-10-CM

## 2024-06-06 DIAGNOSIS — M53.9 MULTILEVEL DEGENERATIVE DISC DISEASE: ICD-10-CM

## 2024-06-06 PROCEDURE — 98941 CHIROPRACT MANJ 3-4 REGIONS: CPT | Performed by: CHIROPRACTOR

## 2024-06-06 NOTE — PROGRESS NOTES
Subjective   Patient ID: Desiree Cochran is a 83 y.o. female who presents June 6, 2024 for chiropractic care.    Medicare    Today, the patient rates their degree of pain as a 3 out of 10 on the numeric pain rating scale.     Desiree returns for continued chiropractic care. Today, she states that she has been feeling pretty good since her last visit. She states that she occasionally with experience some low back pain. The patient denies any changes in health since her last encounter and will follow up as scheduled.    _________________________________________  Initial exam:  Desiree Cochran presents for evaluation and treatment of chronic low back pain, neck pain, and upper back pain. She states that her low back pain symptoms have been present for years and occurs constantly. She states that her symptoms stem from a fall that occurred 30+ years ago. She states that her low back pain has progressively worsened. She states that she has been experiencing numbness and tingling down her right leg to the foot. She states that lifting objects and sitting aggravates her low back symptoms. Desiree also presents with neck pain and upper back pain. She states that she finds it difficult to lay flat. She states that she has noticed decreased range of motion and stiffness. She reports that she has noticed that her neck and upper back symptoms have also progressively worsened. She states that she does experience headaches.     XR Cervical Spine:   FINDINGS:  Reversal normal cervical lordosis is seen.  Osteopenia is present. The prevertebral soft tissues are unremarkable.  Endplate sclerosis and spur formation is seen throughout the cervical  spine. Narrowing of C3-4, C4-5 C5-6 and C6-7 disc space is seen.  Uncovertebral joint hypertrophy seen.  No acute fracture or dislocation is seen.    XR Lumbar Spine:  FINDINGS:  Mild levoscoliosis is noted.  Transitional level is present and will be referred to as L6 in this  dictation. Osteopenia is  present.  End-plate sclerosis and spur formation is seen throughout the lumbar  spine. Facet hypertrophy is identified. Narrowing of L3-4 L5-6 and  L6-S1 disc space is noted. Grade 1 anterolisthesis of L5-L6 is seen.  No acute fracture or dislocation is seen.      Objective   Physical Exam  Neurological:      General: No focal deficit present.      Mental Status: She is alert and oriented to person, place, and time.      Cranial Nerves: No dysarthria or facial asymmetry.      Sensory: Sensation is intact.      Motor: Motor function is intact.      Coordination: Coordination is intact.      Gait: Gait is intact.       Palpation of the following region(s) revealed:  Cervical: Scalenes bilateral, hypertonicity and tenderness.  Upper trapezius bilateral, hypertonicity and tenderness.  Levator scapulae bilateral, hypertonicity and tenderness.  Cervical paraspinals bilateral, hypertonicity and tenderness.  Thoracic: Thoracic paraspinals bilateral, hypertonicity and tenderness.  Middle trapezius bilateral, hypertonicity and tenderness.  Rhomboids bilateral, hypertonicity and tenderness.  Lumbar: Lumbar paraspinals right, hypertonicity and tenderness.  Quadratus lumborum right, hypertonicity and tenderness.  Gluteal right, hypertonicity and tenderness.  Piriformis right, hypertonicity and tenderness.    Segmental Joint(s): Segmental joint dysfunction was assessed with motion palpation and is identified in the following areas:  Thoracic : T1, T2., T8, and T10  Lumbopelvic / Sacral SIJ : L1, L5/S1, R SIJ, and L SIJ    Assessment/Plan   Today's Treatment Included: Chiropractic manipulation to the  Segmental Joint(s) Lumbopelvic/Sacral SIJ : L1, L5/S1, R SIJ, and L SIJ Segmental Joint(s) Thoracic : T1, T2., T8, and T10   Treatment Techniques Used : Activator/Tool assisted technique and Pelvic blocking technique  Pin and stretch    Soft-tissue mobilization was performed in the following areas:   Cervical paraspinal mm.  bilateral, Scalenes bilateral, Upper Trapezius bilateral, and Levator Scap. bilateral  Thoracic Paraspinal mm. bilateral, Middle Trapezius bilateral, and Rhomboids bilateral  Lumbar Paraspinal mm. right, Quadratus Lumborum right, Gluteal mm. Glute. Max.  and Glute. Med. right, and Piriformis right  All soft tissue treatments performed while patient was prone, on pelvic blocks.     The patient tolerated today's treatment with little or no additional discomfort and was instructed to contact the office for questions or concerns.

## 2024-06-06 NOTE — PROGRESS NOTES
Physical Therapy    Physical Therapy Evaluation and Treatment      Patient Name: Desiree Cochran  MRN: [id number]  Today's Date: 6/11/2024  Time Calculation  Start Time: 1145  Stop Time: 1225  Time Calculation (min): 40 min  PT Evaluation Time Entry  PT Evaluation (Low) Time Entry: 25    PT Therapeutic Procedures Time Entry  Therapeutic Exercise Time Entry: 15           Assessment:    Patient presents with clinical signs and symptoms R  shoulder rotator cuff strain  secondary to direct fall on to L shoulder. L shoulder steroid injection helped alleviate pain substantially.These impairments affect ADLs, work, recreational activity, exercise, transfer ability,  and sleep function that requires skilled PT intervention to resolve and enable patient to return to previous level of function. Factors that may affect progress in PT are chronic pain, obesity, medical co-morbidities,  and patient compliance.  Patient response to initial treatment of  exercise   was understood by Desiree Cochran        Plan:  OP PT Plan  Treatment/Interventions: Cryotherapy, Education/ Instruction, Hot pack, Manual therapy, Self care/ home management, Taping techniques, Therapeutic activities, Therapeutic exercises  PT Plan: Skilled PT  PT Frequency: 1 time per week  Duration: 12  Onset Date: 04/14/24  Certification Period Start Date: 06/11/24  Certification Period End Date: 09/09/24  Rehab Potential: Good  Plan of Care Agreement: Patient    Current Problem:   1. Acute shoulder pain due to trauma, right        2. Right shoulder strain  Referral to Physical Therapy          Subjective    Desiree Cochran injured R shoulder in fall secondary to vertigo. She had  steroid injectiion 4/21/24 and it helped a lot. She is much better since injection. Still having night pain and difficulty lifting things.     Pain:   1-4/10  Home Living:   Lives with family in home with basement  Prior Level of Function:  Prior Function Per Pt/Caregiver Report  Hand Dominance:  Rightindependent ADLs, drives a car, reads , gardens    Objective   Cognition:   A+Ox3  Observation:    Cervical clearance  Flexion restricted and pain posterior , ext 45 deg P, rotation 50% bilat  Thoracic spine mobility: stiff  Posture: good alignment    Shoulder ROM:  Flex AROM  R 152 deg P  L 160 deg  PROM:  R  L  deg, Abduct AROM:  R 142 P L 160  deg PROM:  R  L deg, ER  AROM:  R  L  deg, PROM  R      L        deg      Functional IR reach behind back to  T7 level  R   T5 level L    Shoulder strength:  Flex  R 3+ /5P   L 5/5,  Abd  R  3+/5 P L 5/5, ER R  3+/5 P  L  5/5, IR  R  3+/5 P L 5 /5    Scapular stability:  excessive upward rotation with elevation     Palpation: tenderness to    AC joint      rotator interval       long head bicep        Special Tests:   Leeanna  subacromial impingement -    empty can+       Outcome Measures:  QuickDash:  17%    Treatments:  There ex  Ylw tband ER, IR, and Row 2x 20 except ER 2x10  Wall slide elevation x 20  EDUCATION:   extensive education regarding mechanism of injury, relevant functional anatomy, treatment program rational, self management, HEP, and POC   Exercise documented on Desiree Cochran phone video    Goals:  1. Increase R shoulder AROM equal to L   2. Reduce usual R shoulder pain to 0-2/10  3. Desiree Cochran will be able to place a 3 lb object overhead with R arm x 5 in 30 sec  4. independent  Home exercise program  5. Improve outcome score by 4 points

## 2024-06-11 ENCOUNTER — EVALUATION (OUTPATIENT)
Dept: PHYSICAL THERAPY | Facility: CLINIC | Age: 84
End: 2024-06-11
Payer: MEDICARE

## 2024-06-11 DIAGNOSIS — S46.911A RIGHT SHOULDER STRAIN: ICD-10-CM

## 2024-06-11 DIAGNOSIS — G89.11 ACUTE SHOULDER PAIN DUE TO TRAUMA, RIGHT: Primary | ICD-10-CM

## 2024-06-11 DIAGNOSIS — M25.511 ACUTE SHOULDER PAIN DUE TO TRAUMA, RIGHT: Primary | ICD-10-CM

## 2024-06-11 PROCEDURE — 97161 PT EVAL LOW COMPLEX 20 MIN: CPT | Mod: GP | Performed by: PHYSICAL THERAPIST

## 2024-06-11 PROCEDURE — 97110 THERAPEUTIC EXERCISES: CPT | Mod: GP | Performed by: PHYSICAL THERAPIST

## 2024-06-18 ENCOUNTER — APPOINTMENT (OUTPATIENT)
Dept: PRIMARY CARE | Facility: CLINIC | Age: 84
End: 2024-06-18
Payer: MEDICARE

## 2024-06-20 ENCOUNTER — APPOINTMENT (OUTPATIENT)
Dept: INTEGRATIVE MEDICINE | Facility: CLINIC | Age: 84
End: 2024-06-20
Payer: MEDICARE

## 2024-06-24 ENCOUNTER — APPOINTMENT (OUTPATIENT)
Dept: INTEGRATIVE MEDICINE | Facility: CLINIC | Age: 84
End: 2024-06-24
Payer: MEDICARE

## 2024-06-24 DIAGNOSIS — M53.9 MULTILEVEL DEGENERATIVE DISC DISEASE: ICD-10-CM

## 2024-06-24 DIAGNOSIS — M79.10 MYALGIA: ICD-10-CM

## 2024-06-24 DIAGNOSIS — M99.04 SEGMENTAL AND SOMATIC DYSFUNCTION OF SACRAL REGION: ICD-10-CM

## 2024-06-24 DIAGNOSIS — M99.02 SEGMENTAL AND SOMATIC DYSFUNCTION OF THORACIC REGION: ICD-10-CM

## 2024-06-24 DIAGNOSIS — M99.03 SEGMENTAL AND SOMATIC DYSFUNCTION OF LUMBAR REGION: Primary | ICD-10-CM

## 2024-06-24 DIAGNOSIS — M54.17 LUMBOSACRAL RADICULOPATHY: ICD-10-CM

## 2024-06-24 PROCEDURE — 98941 CHIROPRACT MANJ 3-4 REGIONS: CPT | Performed by: CHIROPRACTOR

## 2024-06-24 NOTE — PROGRESS NOTES
Subjective   Patient ID: Desiree Cochran is a 83 y.o. female who presents June 24, 2024 for chiropractic care.    Medicare    Today, the patient rates their degree of pain as a 3 out of 10 on the numeric pain rating scale.     Desiree returns for continued chiropractic care. Today, she reports that her shoulder has improved significantly. She states that she continues to experience some discomfort following her exercises but states that these symptoms subside after icing the area. She states that her low back symptoms continue occur intermittently with little to no change in presentation. The patient denies any changes in health since her last encounter and will follow up as scheduled.    _________________________________________  Initial exam:  Desiree Cochran presents for evaluation and treatment of chronic low back pain, neck pain, and upper back pain. She states that her low back pain symptoms have been present for years and occurs constantly. She states that her symptoms stem from a fall that occurred 30+ years ago. She states that her low back pain has progressively worsened. She states that she has been experiencing numbness and tingling down her right leg to the foot. She states that lifting objects and sitting aggravates her low back symptoms. Desiree also presents with neck pain and upper back pain. She states that she finds it difficult to lay flat. She states that she has noticed decreased range of motion and stiffness. She reports that she has noticed that her neck and upper back symptoms have also progressively worsened. She states that she does experience headaches.     XR Cervical Spine:   FINDINGS:  Reversal normal cervical lordosis is seen.  Osteopenia is present. The prevertebral soft tissues are unremarkable.  Endplate sclerosis and spur formation is seen throughout the cervical  spine. Narrowing of C3-4, C4-5 C5-6 and C6-7 disc space is seen.  Uncovertebral joint hypertrophy seen.  No acute fracture or  dislocation is seen.    XR Lumbar Spine:  FINDINGS:  Mild levoscoliosis is noted.  Transitional level is present and will be referred to as L6 in this  dictation. Osteopenia is present.  End-plate sclerosis and spur formation is seen throughout the lumbar  spine. Facet hypertrophy is identified. Narrowing of L3-4 L5-6 and  L6-S1 disc space is noted. Grade 1 anterolisthesis of L5-L6 is seen.  No acute fracture or dislocation is seen.      Objective   Physical Exam  Neurological:      General: No focal deficit present.      Mental Status: She is alert and oriented to person, place, and time.      Cranial Nerves: No dysarthria or facial asymmetry.      Sensory: Sensation is intact.      Motor: Motor function is intact.      Coordination: Coordination is intact.      Gait: Gait is intact.       Palpation of the following region(s) revealed:  Cervical: Scalenes bilateral, hypertonicity and tenderness.  Upper trapezius bilateral, hypertonicity and tenderness.  Levator scapulae bilateral, hypertonicity and tenderness.  Cervical paraspinals bilateral, hypertonicity and tenderness.  Thoracic: Thoracic paraspinals bilateral, hypertonicity and tenderness.  Middle trapezius bilateral, hypertonicity and tenderness.  Rhomboids bilateral, hypertonicity and tenderness.  Lumbar: Lumbar paraspinals right, hypertonicity and tenderness.  Quadratus lumborum right, hypertonicity and tenderness.  Gluteal right, hypertonicity and tenderness.  Piriformis right, hypertonicity and tenderness.    Segmental Joint(s): Segmental joint dysfunction was assessed with motion palpation and is identified in the following areas:  Thoracic : T1, T2., T8, and T10  Lumbopelvic / Sacral SIJ : L1, L5/S1, R SIJ, and L SIJ    Assessment/Plan   Today's Treatment Included: Chiropractic manipulation to the  Segmental Joint(s) Lumbopelvic/Sacral SIJ : L1, L5/S1, R SIJ, and L SIJ Segmental Joint(s) Thoracic : T1, T2., T8, and T10   Treatment Techniques Used :  Activator/Tool assisted technique and Pelvic blocking technique  Pin and stretch    Soft-tissue mobilization was performed in the following areas:   Cervical paraspinal mm. bilateral, Scalenes bilateral, Upper Trapezius bilateral, and Levator Scap. bilateral  Thoracic Paraspinal mm. bilateral, Middle Trapezius bilateral, and Rhomboids bilateral  Lumbar Paraspinal mm. right, Quadratus Lumborum right, Gluteal mm. Glute. Max.  and Glute. Med. right, and Piriformis right  All soft tissue treatments performed while patient was prone, on pelvic blocks.     The patient tolerated today's treatment with little or no additional discomfort and was instructed to contact the office for questions or concerns.

## 2024-06-28 ENCOUNTER — TREATMENT (OUTPATIENT)
Dept: PHYSICAL THERAPY | Facility: CLINIC | Age: 84
End: 2024-06-28
Payer: MEDICARE

## 2024-06-28 DIAGNOSIS — S46.911A RIGHT SHOULDER STRAIN: ICD-10-CM

## 2024-06-28 DIAGNOSIS — M25.511 ACUTE SHOULDER PAIN DUE TO TRAUMA, RIGHT: Primary | ICD-10-CM

## 2024-06-28 DIAGNOSIS — G89.11 ACUTE SHOULDER PAIN DUE TO TRAUMA, RIGHT: Primary | ICD-10-CM

## 2024-06-28 PROCEDURE — 97110 THERAPEUTIC EXERCISES: CPT | Mod: GP,CQ

## 2024-06-28 NOTE — PROGRESS NOTES
"Physical Therapy    Physical Therapy Evaluation and Treatment      Patient Name: Desiree Cochran  MRN: [id number]  Today's Date: 6/28/2024  Time Calculation  Start Time: 1235  Stop Time: 1300  Time Calculation (min): 25 min       PT Therapeutic Procedures Time Entry  Therapeutic Exercise Time Entry: 25           Assessment:  Weakness noted throughout rx today, and pt was able to gently progress       Plan:   raises    Current Problem:   1. Acute shoulder pain due to trauma, right        2. Right shoulder strain              Subjective    \"I am doing well, no problems w/ ex's.\"  Pain:   1-4/10      Objective   R biceps tenon thickened and tender on palpation. Triceps stretch w/ AROM ER initially    Shoulder ROM:  Flex AROM  R 152 deg P  L 160 deg  PROM:  R  L  deg, Abduct AROM:  R 142 P L 160  deg PROM:  R  L deg, ER  AROM:  R  L  deg, PROM  R      L        deg      Functional IR reach behind back to  T7 level  R   T5 level L    Shoulder strength:  Flex  R 3+ /5P   L 5/5,  Abd  R  3+/5 P L 5/5, ER R  3+/5 P  L  5/5, IR  R  3+/5 P L 5 /5    Scapular stability:  excessive upward rotation with elevation     Palpation: tenderness to    AC joint      rotator interval       long head bicep        Special Tests:   Neer  subacromial impingement -    empty can+       Outcome Measures:  QuickDash:  17%    Treatments:  MT to R UT/Supra, ant/caudal glides to R shoulder  There ex  S/L ER, abd x 20 ea  Supine ecc. Flexion x 20  AA wall flexion x 20, mod range 2` LBP  Raises =flex, scap, abd x 10 ea  Ylw tband ER, IR, and Row 2x 10 ea      Goals:  1. Increase R shoulder AROM equal to L   2. Reduce usual R shoulder pain to 0-2/10  3. Desiree Cochran will be able to place a 3 lb object overhead with R arm x 5 in 30 sec  4. independent  Home exercise program  5. Improve outcome score by 4 points              "

## 2024-07-02 PROBLEM — M25.511 ACUTE SHOULDER PAIN DUE TO TRAUMA, RIGHT: Status: ACTIVE | Noted: 2024-07-02

## 2024-07-02 PROBLEM — G89.11 ACUTE SHOULDER PAIN DUE TO TRAUMA, RIGHT: Status: ACTIVE | Noted: 2024-07-02

## 2024-07-02 PROBLEM — S46.911A RIGHT SHOULDER STRAIN: Status: ACTIVE | Noted: 2024-07-02

## 2024-07-02 NOTE — PROGRESS NOTES
Physical Therapy    Physical Therapy Evaluation and Treatment      Patient Name: Desiree Cochran  MRN: [id number]  Today's Date: 7/3/24  Time Calculation  Start Time: 1440  Stop Time: 1510  Time Calculation (min): 30 min       PT Therapeutic Procedures Time Entry  Therapeutic Exercise Time Entry: 30           Assessment:  Desiree Cochran has achieved all goals of skilled PT. Her scapulohumeral rhythm is symmetrical. She is able to continue non an independent Home exercise program        Plan:     Physical Therapy    Discharge Summary    Name: Desiree Cochran  MRN: [id number]  : 1940  Date: 24    Discharge Summary: PT    Discharge Information: Date of discharge 7/3/24, Date of last visit 7/3/24, and Number of attended visits 3    Therapy Summary: see objective and assessment    Discharge Status: goals achieved     Rehab Discharge Reason: Achieved all and/or the most significant goals(s)  Current Problem:   1. Acute shoulder pain due to trauma, right        2. Right shoulder strain              Subjective     Desiree Cochran notes that R shoulder was worse  since sleeping with arm over head last night  but pain resolved now . Desiree Cochran reports overall much better R shoulder motion and strength.  Pain:   0-1/10      Objective       Shoulder ROM:  Flex AROM  R 162 deg   L 160 deg   Abduct AROM:  R 162  L 160  deg        deg      Functional IR reach behind back to  T7 level  R   T5 level L    Shoulder strength:  Flex  R 4 -/5P   L 5/5,  Abd  R  4/5  L 5/5, ER R  3+/5 P  L  5/5, IR  R  3+/5 P L 5 /5    Scapular stability:  symmetrical scapular mobility with bilat shoulder elevation    Outcome Measures:  QuickDash:  10%    Treatments:    There ex  Towel wall slide all directions x10 each  Red  tband ER fwd punch 2x 10  Written HEP given to Desiree Cochran   Goals:  1. Increase R shoulder AROM equal to L A  2. Reduce usual R shoulder pain to 0-2/10A  3. Desiree Cochran will be able to place a 3 lb object overhead with R arm x 5  in 30 secA  4. independent  Home exercise programA  5. Improve outcome score by 4 points A

## 2024-07-03 ENCOUNTER — TREATMENT (OUTPATIENT)
Dept: PHYSICAL THERAPY | Facility: CLINIC | Age: 84
End: 2024-07-03
Payer: MEDICARE

## 2024-07-03 DIAGNOSIS — G89.11 ACUTE SHOULDER PAIN DUE TO TRAUMA, RIGHT: Primary | ICD-10-CM

## 2024-07-03 DIAGNOSIS — S46.911A RIGHT SHOULDER STRAIN: ICD-10-CM

## 2024-07-03 DIAGNOSIS — M25.511 ACUTE SHOULDER PAIN DUE TO TRAUMA, RIGHT: Primary | ICD-10-CM

## 2024-07-03 PROCEDURE — 97110 THERAPEUTIC EXERCISES: CPT | Mod: GP | Performed by: PHYSICAL THERAPIST

## 2024-07-09 ENCOUNTER — APPOINTMENT (OUTPATIENT)
Dept: INTEGRATIVE MEDICINE | Facility: CLINIC | Age: 84
End: 2024-07-09
Payer: MEDICARE

## 2024-07-09 DIAGNOSIS — M79.10 MYALGIA: ICD-10-CM

## 2024-07-09 DIAGNOSIS — M53.9 MULTILEVEL DEGENERATIVE DISC DISEASE: ICD-10-CM

## 2024-07-09 DIAGNOSIS — M99.03 SEGMENTAL AND SOMATIC DYSFUNCTION OF LUMBAR REGION: Primary | ICD-10-CM

## 2024-07-09 DIAGNOSIS — M99.02 SEGMENTAL AND SOMATIC DYSFUNCTION OF THORACIC REGION: ICD-10-CM

## 2024-07-09 DIAGNOSIS — M54.17 LUMBOSACRAL RADICULOPATHY: ICD-10-CM

## 2024-07-09 DIAGNOSIS — M99.04 SEGMENTAL AND SOMATIC DYSFUNCTION OF SACRAL REGION: ICD-10-CM

## 2024-07-09 PROCEDURE — 98941 CHIROPRACT MANJ 3-4 REGIONS: CPT | Performed by: CHIROPRACTOR

## 2024-07-09 NOTE — PROGRESS NOTES
Subjective   Patient ID: Desiree Cochran is a 83 y.o. female who presents July 9, 2024 for chiropractic care.    Medicare    Today, the patient rates their degree of pain as a 3 out of 10 on the numeric pain rating scale.     Desiree returns for continued chiropractic care. Today, she states that she has been doing well. She presents with mild, intermittent pain in the left gluteal region. However, she states that her symptoms are manageable and she is no longer experiencing any shoulder pain. The patient denies any changes in health since her last encounter and will follow up as scheduled.    _________________________________________  Initial exam:  Desiree Cochran presents for evaluation and treatment of chronic low back pain, neck pain, and upper back pain. She states that her low back pain symptoms have been present for years and occurs constantly. She states that her symptoms stem from a fall that occurred 30+ years ago. She states that her low back pain has progressively worsened. She states that she has been experiencing numbness and tingling down her right leg to the foot. She states that lifting objects and sitting aggravates her low back symptoms. Desiree also presents with neck pain and upper back pain. She states that she finds it difficult to lay flat. She states that she has noticed decreased range of motion and stiffness. She reports that she has noticed that her neck and upper back symptoms have also progressively worsened. She states that she does experience headaches.     XR Cervical Spine:   FINDINGS:  Reversal normal cervical lordosis is seen.  Osteopenia is present. The prevertebral soft tissues are unremarkable.  Endplate sclerosis and spur formation is seen throughout the cervical  spine. Narrowing of C3-4, C4-5 C5-6 and C6-7 disc space is seen.  Uncovertebral joint hypertrophy seen.  No acute fracture or dislocation is seen.    XR Lumbar Spine:  FINDINGS:  Mild levoscoliosis is noted.  Transitional level  is present and will be referred to as L6 in this  dictation. Osteopenia is present.  End-plate sclerosis and spur formation is seen throughout the lumbar  spine. Facet hypertrophy is identified. Narrowing of L3-4 L5-6 and  L6-S1 disc space is noted. Grade 1 anterolisthesis of L5-L6 is seen.  No acute fracture or dislocation is seen.      Objective   Physical Exam  Neurological:      General: No focal deficit present.      Mental Status: She is alert and oriented to person, place, and time.      Cranial Nerves: No dysarthria or facial asymmetry.      Sensory: Sensation is intact.      Motor: Motor function is intact.      Coordination: Coordination is intact.      Gait: Gait is intact.       Palpation of the following region(s) revealed:  Cervical: Scalenes bilateral, hypertonicity and tenderness.  Upper trapezius bilateral, hypertonicity and tenderness.  Levator scapulae bilateral, hypertonicity and tenderness.  Cervical paraspinals bilateral, hypertonicity and tenderness.  Thoracic: Thoracic paraspinals bilateral, hypertonicity and tenderness.  Middle trapezius bilateral, hypertonicity and tenderness.  Rhomboids bilateral, hypertonicity and tenderness.  Lumbar: Lumbar paraspinals right, hypertonicity and tenderness.  Quadratus lumborum right, hypertonicity and tenderness.  Gluteal right, hypertonicity and tenderness.  Piriformis right, hypertonicity and tenderness.    Segmental Joint(s): Segmental joint dysfunction was assessed with motion palpation and is identified in the following areas:  Thoracic : T1, T2., T8, and T10  Lumbopelvic / Sacral SIJ : L1, L5/S1, R SIJ, and L SIJ    Assessment/Plan   Today's Treatment Included: Chiropractic manipulation to the  Segmental Joint(s) Lumbopelvic/Sacral SIJ : L1, L5/S1, R SIJ, and L SIJ Segmental Joint(s) Thoracic : T1, T2., T8, and T10   Treatment Techniques Used : Activator/Tool assisted technique and Pelvic blocking technique  Pin and stretch    Soft-tissue  mobilization was performed in the following areas:   Cervical paraspinal mm. bilateral, Scalenes bilateral, Upper Trapezius bilateral, and Levator Scap. bilateral  Thoracic Paraspinal mm. bilateral, Middle Trapezius bilateral, and Rhomboids bilateral  Lumbar Paraspinal mm. right, Quadratus Lumborum right, Gluteal mm. Glute. Max.  and Glute. Med. right, and Piriformis right  All soft tissue treatments performed while patient was prone, on pelvic blocks.     The patient tolerated today's treatment with little or no additional discomfort and was instructed to contact the office for questions or concerns.

## 2024-07-10 ENCOUNTER — APPOINTMENT (OUTPATIENT)
Dept: PHYSICAL THERAPY | Facility: CLINIC | Age: 84
End: 2024-07-10
Payer: MEDICARE

## 2024-07-17 ENCOUNTER — APPOINTMENT (OUTPATIENT)
Dept: PHYSICAL THERAPY | Facility: CLINIC | Age: 84
End: 2024-07-17
Payer: MEDICARE

## 2024-07-23 ENCOUNTER — APPOINTMENT (OUTPATIENT)
Dept: INTEGRATIVE MEDICINE | Facility: CLINIC | Age: 84
End: 2024-07-23
Payer: MEDICARE

## 2024-07-23 DIAGNOSIS — M79.10 MYALGIA: ICD-10-CM

## 2024-07-23 DIAGNOSIS — M99.04 SEGMENTAL AND SOMATIC DYSFUNCTION OF SACRAL REGION: ICD-10-CM

## 2024-07-23 DIAGNOSIS — M54.17 LUMBOSACRAL RADICULOPATHY: ICD-10-CM

## 2024-07-23 DIAGNOSIS — M53.9 MULTILEVEL DEGENERATIVE DISC DISEASE: ICD-10-CM

## 2024-07-23 DIAGNOSIS — M99.03 SEGMENTAL AND SOMATIC DYSFUNCTION OF LUMBAR REGION: Primary | ICD-10-CM

## 2024-07-23 DIAGNOSIS — M99.02 SEGMENTAL AND SOMATIC DYSFUNCTION OF THORACIC REGION: ICD-10-CM

## 2024-07-23 PROCEDURE — 98941 CHIROPRACT MANJ 3-4 REGIONS: CPT | Performed by: CHIROPRACTOR

## 2024-07-23 NOTE — PROGRESS NOTES
Subjective   Patient ID: Desiree Cochran is a 83 y.o. female who presents July 23, 2024 for chiropractic care.    Medicare    Today, the patient rates their degree of pain as a 3 out of 10 on the numeric pain rating scale.     Desiree returns for continued chiropractic care. Today, she reports that she continues to do well. She states that she has noticed that her strength has been increasing steadily. The patient denies any changes in health since her last encounter and will follow up as scheduled.    _________________________________________  Initial exam:  Desiree Cochran presents for evaluation and treatment of chronic low back pain, neck pain, and upper back pain. She states that her low back pain symptoms have been present for years and occurs constantly. She states that her symptoms stem from a fall that occurred 30+ years ago. She states that her low back pain has progressively worsened. She states that she has been experiencing numbness and tingling down her right leg to the foot. She states that lifting objects and sitting aggravates her low back symptoms. Desiree also presents with neck pain and upper back pain. She states that she finds it difficult to lay flat. She states that she has noticed decreased range of motion and stiffness. She reports that she has noticed that her neck and upper back symptoms have also progressively worsened. She states that she does experience headaches.     XR Cervical Spine:   FINDINGS:  Reversal normal cervical lordosis is seen.  Osteopenia is present. The prevertebral soft tissues are unremarkable.  Endplate sclerosis and spur formation is seen throughout the cervical  spine. Narrowing of C3-4, C4-5 C5-6 and C6-7 disc space is seen.  Uncovertebral joint hypertrophy seen.  No acute fracture or dislocation is seen.    XR Lumbar Spine:  FINDINGS:  Mild levoscoliosis is noted.  Transitional level is present and will be referred to as L6 in this  dictation. Osteopenia is present.  End-plate  sclerosis and spur formation is seen throughout the lumbar  spine. Facet hypertrophy is identified. Narrowing of L3-4 L5-6 and  L6-S1 disc space is noted. Grade 1 anterolisthesis of L5-L6 is seen.  No acute fracture or dislocation is seen.      Objective   Physical Exam  Neurological:      General: No focal deficit present.      Mental Status: She is alert and oriented to person, place, and time.      Cranial Nerves: No dysarthria or facial asymmetry.      Sensory: Sensation is intact.      Motor: Motor function is intact.      Coordination: Coordination is intact.      Gait: Gait is intact.       Palpation of the following region(s) revealed:  Cervical: Scalenes bilateral, hypertonicity and tenderness.  Upper trapezius bilateral, hypertonicity and tenderness.  Levator scapulae bilateral, hypertonicity and tenderness.  Cervical paraspinals bilateral, hypertonicity and tenderness.  Thoracic: Thoracic paraspinals bilateral, hypertonicity and tenderness.  Middle trapezius bilateral, hypertonicity and tenderness.  Rhomboids bilateral, hypertonicity and tenderness.  Lumbar: Lumbar paraspinals right, hypertonicity and tenderness.  Quadratus lumborum right, hypertonicity and tenderness.  Gluteal right, hypertonicity and tenderness.  Piriformis right, hypertonicity and tenderness.    Segmental Joint(s): Segmental joint dysfunction was assessed with motion palpation and is identified in the following areas:  Thoracic : T1, T2., T8, and T10  Lumbopelvic / Sacral SIJ : L1, L5/S1, R SIJ, and L SIJ    Assessment/Plan   Today's Treatment Included: Chiropractic manipulation to the  Segmental Joint(s) Lumbopelvic/Sacral SIJ : L1, L5/S1, R SIJ, and L SIJ Segmental Joint(s) Thoracic : T1, T2., T8, and T10   Treatment Techniques Used : Activator/Tool assisted technique and Pelvic blocking technique  Pin and stretch    Soft-tissue mobilization was performed in the following areas:   Cervical paraspinal mm. bilateral, Scalenes  bilateral, Upper Trapezius bilateral, and Levator Scap. bilateral  Thoracic Paraspinal mm. bilateral, Middle Trapezius bilateral, and Rhomboids bilateral  Lumbar Paraspinal mm. right, Quadratus Lumborum right, Gluteal mm. Glute. Max.  and Glute. Med. right, and Piriformis right  All soft tissue treatments performed while patient was prone, on pelvic blocks.     The patient tolerated today's treatment with little or no additional discomfort and was instructed to contact the office for questions or concerns.

## 2024-07-28 DIAGNOSIS — E78.5 DYSLIPIDEMIA: ICD-10-CM

## 2024-07-29 RX ORDER — SIMVASTATIN 10 MG/1
10 TABLET, FILM COATED ORAL NIGHTLY
Qty: 90 TABLET | Refills: 1 | Status: SHIPPED | OUTPATIENT
Start: 2024-07-29

## 2024-08-09 ENCOUNTER — APPOINTMENT (OUTPATIENT)
Dept: PRIMARY CARE | Facility: CLINIC | Age: 84
End: 2024-08-09
Payer: MEDICARE

## 2024-08-09 ENCOUNTER — LAB (OUTPATIENT)
Dept: LAB | Facility: LAB | Age: 84
End: 2024-08-09
Payer: MEDICARE

## 2024-08-09 VITALS
SYSTOLIC BLOOD PRESSURE: 136 MMHG | WEIGHT: 104 LBS | BODY MASS INDEX: 22.44 KG/M2 | HEART RATE: 96 BPM | HEIGHT: 57 IN | DIASTOLIC BLOOD PRESSURE: 74 MMHG | OXYGEN SATURATION: 97 %

## 2024-08-09 DIAGNOSIS — Z00.00 ROUTINE GENERAL MEDICAL EXAMINATION AT HEALTH CARE FACILITY: Primary | ICD-10-CM

## 2024-08-09 DIAGNOSIS — M85.80 OSTEOPENIA, UNSPECIFIED LOCATION: ICD-10-CM

## 2024-08-09 DIAGNOSIS — I10 PRIMARY HYPERTENSION: ICD-10-CM

## 2024-08-09 DIAGNOSIS — E78.5 DYSLIPIDEMIA: ICD-10-CM

## 2024-08-09 DIAGNOSIS — R93.1 ELEVATED CORONARY ARTERY CALCIUM SCORE: ICD-10-CM

## 2024-08-09 DIAGNOSIS — Z78.0 POSTMENOPAUSAL ESTROGEN DEFICIENCY: ICD-10-CM

## 2024-08-09 DIAGNOSIS — Z12.31 ENCOUNTER FOR SCREENING MAMMOGRAM FOR MALIGNANT NEOPLASM OF BREAST: ICD-10-CM

## 2024-08-09 DIAGNOSIS — M54.17 LUMBOSACRAL NEURITIS: ICD-10-CM

## 2024-08-09 PROBLEM — G89.11 ACUTE SHOULDER PAIN DUE TO TRAUMA, RIGHT: Status: RESOLVED | Noted: 2024-07-02 | Resolved: 2024-08-09

## 2024-08-09 PROBLEM — H81.10 BENIGN PAROXYSMAL POSITIONAL VERTIGO: Status: RESOLVED | Noted: 2023-03-02 | Resolved: 2024-08-09

## 2024-08-09 PROBLEM — L98.9 SKIN LESION: Status: RESOLVED | Noted: 2023-03-02 | Resolved: 2024-08-09

## 2024-08-09 PROBLEM — M25.511 ACUTE SHOULDER PAIN DUE TO TRAUMA, RIGHT: Status: RESOLVED | Noted: 2024-07-02 | Resolved: 2024-08-09

## 2024-08-09 PROBLEM — Z86.19 HISTORY OF BACTERIAL INFECTION: Status: RESOLVED | Noted: 2024-04-19 | Resolved: 2024-08-09

## 2024-08-09 PROBLEM — H61.21 IMPACTED CERUMEN OF RIGHT EAR: Status: RESOLVED | Noted: 2024-04-19 | Resolved: 2024-08-09

## 2024-08-09 PROBLEM — N76.0 VAGINITIS AND VULVOVAGINITIS: Status: RESOLVED | Noted: 2023-03-02 | Resolved: 2024-08-09

## 2024-08-09 PROBLEM — K52.9 INFLAMMATION OF STOMACH AND INTESTINE: Status: RESOLVED | Noted: 2024-04-19 | Resolved: 2024-08-09

## 2024-08-09 PROBLEM — M19.019 OSTEOARTHRITIS OF SHOULDER: Status: RESOLVED | Noted: 2023-03-02 | Resolved: 2024-08-09

## 2024-08-09 PROBLEM — H93.8X2 SENSATION OF PLUGGED EAR ON LEFT SIDE: Status: RESOLVED | Noted: 2024-04-19 | Resolved: 2024-08-09

## 2024-08-09 PROBLEM — H93.13 SUBJECTIVE TINNITUS OF BOTH EARS: Status: RESOLVED | Noted: 2023-03-02 | Resolved: 2024-08-09

## 2024-08-09 PROBLEM — N76.0 VULVOVAGINITIS: Status: RESOLVED | Noted: 2023-03-02 | Resolved: 2024-08-09

## 2024-08-09 PROBLEM — R10.13 EPIGASTRIC ABDOMINAL PAIN: Status: RESOLVED | Noted: 2023-04-12 | Resolved: 2024-08-09

## 2024-08-09 PROBLEM — F43.21 GRIEF: Status: RESOLVED | Noted: 2024-04-19 | Resolved: 2024-08-09

## 2024-08-09 PROBLEM — J34.89 NASAL DISCHARGE: Status: RESOLVED | Noted: 2023-10-26 | Resolved: 2024-08-09

## 2024-08-09 PROBLEM — R21 RASH: Status: RESOLVED | Noted: 2024-04-19 | Resolved: 2024-08-09

## 2024-08-09 PROBLEM — S46.911A RIGHT SHOULDER STRAIN: Status: RESOLVED | Noted: 2024-07-02 | Resolved: 2024-08-09

## 2024-08-09 PROBLEM — H93.A2 SUBJECTIVE PULSATILE TINNITUS OF LEFT EAR: Status: RESOLVED | Noted: 2024-04-19 | Resolved: 2024-08-09

## 2024-08-09 PROBLEM — R17 INCREASED BILIRUBIN LEVEL: Status: RESOLVED | Noted: 2023-03-02 | Resolved: 2024-08-09

## 2024-08-09 PROBLEM — R10.13 EPIGASTRIC PAIN: Status: RESOLVED | Noted: 2023-03-30 | Resolved: 2024-08-09

## 2024-08-09 LAB
25(OH)D3 SERPL-MCNC: 39 NG/ML (ref 30–100)
ALBUMIN SERPL BCP-MCNC: 4.5 G/DL (ref 3.4–5)
ALP SERPL-CCNC: 99 U/L (ref 33–136)
ALT SERPL W P-5'-P-CCNC: 15 U/L (ref 7–45)
ANION GAP SERPL CALC-SCNC: 14 MMOL/L (ref 10–20)
AST SERPL W P-5'-P-CCNC: 24 U/L (ref 9–39)
BASOPHILS # BLD AUTO: 0.03 X10*3/UL (ref 0–0.1)
BASOPHILS NFR BLD AUTO: 0.9 %
BILIRUB SERPL-MCNC: 1 MG/DL (ref 0–1.2)
BUN SERPL-MCNC: 13 MG/DL (ref 6–23)
CALCIUM SERPL-MCNC: 10.2 MG/DL (ref 8.6–10.6)
CHLORIDE SERPL-SCNC: 99 MMOL/L (ref 98–107)
CHOLEST SERPL-MCNC: 212 MG/DL (ref 0–199)
CHOLESTEROL/HDL RATIO: 3.1
CK SERPL-CCNC: 52 U/L (ref 0–215)
CO2 SERPL-SCNC: 28 MMOL/L (ref 21–32)
CREAT SERPL-MCNC: 0.79 MG/DL (ref 0.5–1.05)
CREAT UR-MCNC: 100.1 MG/DL (ref 20–320)
EGFRCR SERPLBLD CKD-EPI 2021: 75 ML/MIN/1.73M*2
EOSINOPHIL # BLD AUTO: 0.03 X10*3/UL (ref 0–0.4)
EOSINOPHIL NFR BLD AUTO: 0.9 %
ERYTHROCYTE [DISTWIDTH] IN BLOOD BY AUTOMATED COUNT: 13 % (ref 11.5–14.5)
GLUCOSE SERPL-MCNC: 108 MG/DL (ref 74–99)
HCT VFR BLD AUTO: 41.1 % (ref 36–46)
HDLC SERPL-MCNC: 69.1 MG/DL
HGB BLD-MCNC: 13.6 G/DL (ref 12–16)
IMM GRANULOCYTES # BLD AUTO: 0.01 X10*3/UL (ref 0–0.5)
IMM GRANULOCYTES NFR BLD AUTO: 0.3 % (ref 0–0.9)
LDLC SERPL CALC-MCNC: 120 MG/DL
LYMPHOCYTES # BLD AUTO: 0.92 X10*3/UL (ref 0.8–3)
LYMPHOCYTES NFR BLD AUTO: 26.7 %
MCH RBC QN AUTO: 31.3 PG (ref 26–34)
MCHC RBC AUTO-ENTMCNC: 33.1 G/DL (ref 32–36)
MCV RBC AUTO: 95 FL (ref 80–100)
MICROALBUMIN UR-MCNC: <7 MG/L
MICROALBUMIN/CREAT UR: NORMAL MG/G{CREAT}
MONOCYTES # BLD AUTO: 0.34 X10*3/UL (ref 0.05–0.8)
MONOCYTES NFR BLD AUTO: 9.9 %
NEUTROPHILS # BLD AUTO: 2.12 X10*3/UL (ref 1.6–5.5)
NEUTROPHILS NFR BLD AUTO: 61.3 %
NON HDL CHOLESTEROL: 143 MG/DL (ref 0–149)
NRBC BLD-RTO: 0 /100 WBCS (ref 0–0)
PLATELET # BLD AUTO: 266 X10*3/UL (ref 150–450)
POTASSIUM SERPL-SCNC: 4.8 MMOL/L (ref 3.5–5.3)
PROT SERPL-MCNC: 7.4 G/DL (ref 6.4–8.2)
RBC # BLD AUTO: 4.34 X10*6/UL (ref 4–5.2)
SODIUM SERPL-SCNC: 136 MMOL/L (ref 136–145)
TRIGL SERPL-MCNC: 113 MG/DL (ref 0–149)
TSH SERPL-ACNC: 1.35 MIU/L (ref 0.44–3.98)
VLDL: 23 MG/DL (ref 0–40)
WBC # BLD AUTO: 3.5 X10*3/UL (ref 4.4–11.3)

## 2024-08-09 PROCEDURE — 1036F TOBACCO NON-USER: CPT | Performed by: INTERNAL MEDICINE

## 2024-08-09 PROCEDURE — 84443 ASSAY THYROID STIM HORMONE: CPT

## 2024-08-09 PROCEDURE — 36415 COLL VENOUS BLD VENIPUNCTURE: CPT

## 2024-08-09 PROCEDURE — 1159F MED LIST DOCD IN RCRD: CPT | Performed by: INTERNAL MEDICINE

## 2024-08-09 PROCEDURE — 82550 ASSAY OF CK (CPK): CPT

## 2024-08-09 PROCEDURE — 99214 OFFICE O/P EST MOD 30 MIN: CPT | Performed by: INTERNAL MEDICINE

## 2024-08-09 PROCEDURE — 85025 COMPLETE CBC W/AUTO DIFF WBC: CPT

## 2024-08-09 PROCEDURE — 1123F ACP DISCUSS/DSCN MKR DOCD: CPT | Performed by: INTERNAL MEDICINE

## 2024-08-09 PROCEDURE — 3075F SYST BP GE 130 - 139MM HG: CPT | Performed by: INTERNAL MEDICINE

## 2024-08-09 PROCEDURE — G0439 PPPS, SUBSEQ VISIT: HCPCS | Performed by: INTERNAL MEDICINE

## 2024-08-09 PROCEDURE — 1170F FXNL STATUS ASSESSED: CPT | Performed by: INTERNAL MEDICINE

## 2024-08-09 PROCEDURE — 82043 UR ALBUMIN QUANTITATIVE: CPT

## 2024-08-09 PROCEDURE — 82306 VITAMIN D 25 HYDROXY: CPT

## 2024-08-09 PROCEDURE — 80053 COMPREHEN METABOLIC PANEL: CPT

## 2024-08-09 PROCEDURE — 82570 ASSAY OF URINE CREATININE: CPT

## 2024-08-09 PROCEDURE — 99397 PER PM REEVAL EST PAT 65+ YR: CPT | Performed by: INTERNAL MEDICINE

## 2024-08-09 PROCEDURE — 80061 LIPID PANEL: CPT

## 2024-08-09 PROCEDURE — 1160F RVW MEDS BY RX/DR IN RCRD: CPT | Performed by: INTERNAL MEDICINE

## 2024-08-09 PROCEDURE — 3078F DIAST BP <80 MM HG: CPT | Performed by: INTERNAL MEDICINE

## 2024-08-09 ASSESSMENT — ENCOUNTER SYMPTOMS
DIZZINESS: 0
SINUS PAIN: 0
ABDOMINAL PAIN: 0
SHORTNESS OF BREATH: 0
HEMATURIA: 0
LOSS OF SENSATION IN FEET: 0
SINUS PRESSURE: 0
FREQUENCY: 0
NECK STIFFNESS: 0
DIAPHORESIS: 1
DECREASED CONCENTRATION: 0
ARTHRALGIAS: 1
OCCASIONAL FEELINGS OF UNSTEADINESS: 0
CONSTIPATION: 0
NERVOUS/ANXIOUS: 0
RHINORRHEA: 0
FEVER: 0
VOICE CHANGE: 0
SORE THROAT: 0
PALPITATIONS: 0
DYSURIA: 0
ADENOPATHY: 0
CHILLS: 0
FATIGUE: 0
ABDOMINAL DISTENTION: 0
APPETITE CHANGE: 0
HEADACHES: 0
VOMITING: 0
NAUSEA: 0
DIARRHEA: 0
COUGH: 0
CHEST TIGHTNESS: 0
BRUISES/BLEEDS EASILY: 0
EYE DISCHARGE: 0
DYSPHORIC MOOD: 0
COLOR CHANGE: 0
BACK PAIN: 1
SLEEP DISTURBANCE: 0
HYPERACTIVE: 0
DEPRESSION: 0
WHEEZING: 0
ACTIVITY CHANGE: 0
UNEXPECTED WEIGHT CHANGE: 0
NUMBNESS: 0
MYALGIAS: 0
EYE ITCHING: 0
LIGHT-HEADEDNESS: 0
WEAKNESS: 0
NECK PAIN: 0

## 2024-08-09 ASSESSMENT — ACTIVITIES OF DAILY LIVING (ADL)
MANAGING_FINANCES: INDEPENDENT
GROCERY_SHOPPING: INDEPENDENT
DOING_HOUSEWORK: INDEPENDENT
DRESSING: INDEPENDENT
BATHING: INDEPENDENT
TAKING_MEDICATION: INDEPENDENT

## 2024-08-09 NOTE — PROGRESS NOTES
Subjective   Reason for Visit: Desiree Cochran is an 83 y.o. female patient comes in today for comprehensive physical and follow-up of medical conditions in conjunction with annual wellness visit    Ms. Cochran comes in today for comprehensive physical and follow-up of medical conditions in conjunction with annual wellness visit, dictated in a separate note.  Please refer to that note for details on healthcare maintenance and screening studies.    Ms. Cochran comes in today for comprehensive physical as well as a follow-up of medical conditions.  She is feeling reasonably well.  She continues to manage her own finances and does her own shopping.  She has felt well in general.  Her sciatic pain on the right continues to bother her but this responds to Tylenol.  She has only been taking this once daily and she is wondering whether she can increase this.  She denies any headaches, dizziness, chest pain or palpitations.  She did follow-up with cardiology.  She follows with orthopedics as well for her right shoulder, requiring an injection therapy after she fell in April.  She continues on her medications with no change, tolerating these well.  She has been off bisphosphonate therapy for about 1 year and is comfortable updating her DEXA.  GI symptoms are stable.  She continues to drive, tries to stay active with walking.  She really feels like things are stable and doing quite well overall.  She denies any specific concerns at this time other than the sciatic pain.  She is comfortable updating labs, not fasting but for convenience, would prefer to do so today.        Patient Care Team:  Betsy Schneider MD as PCP - General  Betsy Schneider MD as PCP - [employer] PCP     Review of Systems   Constitutional:  Positive for diaphoresis. Negative for activity change, appetite change, chills, fatigue, fever and unexpected weight change.   HENT:  Negative for congestion, ear pain, postnasal drip, rhinorrhea, sinus pressure, sinus  "pain, sneezing, sore throat, tinnitus and voice change.    Eyes:  Negative for discharge, itching and visual disturbance.   Respiratory:  Negative for cough, chest tightness, shortness of breath and wheezing.    Cardiovascular:  Negative for chest pain, palpitations and leg swelling.   Gastrointestinal:  Negative for abdominal distention, abdominal pain, constipation, diarrhea, nausea and vomiting.   Endocrine: Negative for cold intolerance, heat intolerance and polyuria.   Genitourinary:  Negative for dysuria, frequency, hematuria, urgency, vaginal bleeding and vaginal discharge.   Musculoskeletal:  Positive for arthralgias, back pain and gait problem. Negative for myalgias, neck pain and neck stiffness.   Skin:  Negative for color change, pallor and rash.   Allergic/Immunologic: Positive for environmental allergies. Negative for food allergies and immunocompromised state.   Neurological:  Negative for dizziness, syncope, weakness, light-headedness, numbness and headaches.   Hematological:  Negative for adenopathy. Does not bruise/bleed easily.   Psychiatric/Behavioral:  Negative for behavioral problems, decreased concentration, dysphoric mood and sleep disturbance. The patient is not nervous/anxious and is not hyperactive.        Objective   Vitals:  /74   Pulse 96   Ht 1.448 m (4' 9\")   Wt 47.2 kg (104 lb)   SpO2 97%   BMI 22.51 kg/m²       Physical Exam  Constitutional:       General: She is not in acute distress.     Appearance: Normal appearance. She is well-developed. She is not ill-appearing.   HENT:      Head: Normocephalic.      Right Ear: External ear normal. There is impacted cerumen.      Left Ear: External ear normal. There is impacted cerumen.      Nose: Nose normal.      Mouth/Throat:      Mouth: Mucous membranes are moist.      Pharynx: Oropharynx is clear. No oropharyngeal exudate or posterior oropharyngeal erythema.   Eyes:      General: Lids are normal. No scleral icterus.     " Extraocular Movements: Extraocular movements intact.      Conjunctiva/sclera: Conjunctivae normal.      Pupils: Pupils are equal, round, and reactive to light.   Neck:      Vascular: No carotid bruit.   Cardiovascular:      Rate and Rhythm: Normal rate and regular rhythm.      Pulses: Normal pulses.      Heart sounds: No murmur heard.     No gallop.   Pulmonary:      Effort: Pulmonary effort is normal. No respiratory distress.      Breath sounds: No wheezing, rhonchi or rales.   Chest:   Breasts:     Right: No mass.      Left: No mass.   Abdominal:      General: Bowel sounds are normal. There is no distension.      Palpations: Abdomen is soft. There is no mass.      Tenderness: There is no abdominal tenderness. There is no guarding or rebound.   Genitourinary:     Comments: Pt declines  Musculoskeletal:         General: No swelling or signs of injury.      Cervical back: Normal range of motion and neck supple. No tenderness.      Right lower leg: No edema.      Left lower leg: No edema.   Lymphadenopathy:      Cervical: No cervical adenopathy.      Upper Body:      Right upper body: No supraclavicular or axillary adenopathy.      Left upper body: No supraclavicular or axillary adenopathy.   Skin:     General: Skin is warm and dry.      Coloration: Skin is not pale.      Findings: No bruising or rash.   Neurological:      General: No focal deficit present.      Mental Status: She is alert and oriented to person, place, and time.      Cranial Nerves: No cranial nerve deficit.      Motor: No weakness.      Gait: Gait normal.   Psychiatric:         Mood and Affect: Mood normal.         Behavior: Behavior normal.         Judgment: Judgment normal.         Assessment/Plan   Full age-appropriate comprehensive physical exam and health care maintenance performed and discussed today.  Routine safety and preventative measures discussed including self breast exam, seatbelt use, no drinking and driving, no texting and driving,  abstinence or cessation of tobacco use, routine dental and vision exams, healthy diet and regular exercise.    We will update comprehensive labs and follow-up on results once available.  Due for mammogram and DEXA.  Both requisitions placed.  No indication for colonoscopy screening based on advanced age.  EKG up-to-date.  Monitor at appropriate intervals or based on symptoms.  Declines vaccinations, have counseled regarding shingles vaccine, updated tetanus, RSV, annual flu shots and COVID boosters.  Has completed original pneumonia vaccine series.    In addition to the above-mentioned healthcare maintenance and screening studies, the following were discussed and assessed in detail today:  1.  Hyperlipidemia with elevated coronary artery calcium score: Follows closely with cardiology as well.  Update labs.  Adjust medicines as may be needed.  2.  Hypertension: Excellent control today.  No change in therapy.  Update BMP.  Chronic progressive refills needed.  3.  Osteopenia: Due for updated DEXA and vitamin D level.  Has been off bisphosphonate therapy for about 1 year.  If progressive, may need rheumatology referral for further evaluation.  4.  Lumbosacral neuritis: Increase Tylenol to twice daily.  Recommend continued heat alternating with ice, stretching exercises.  If needs referral to medical spine specialist, happy to assist.    Happy to see her back in 6 months for follow-up.  She is to contact us with any questions in the interim.  Problem List Items Addressed This Visit    None  Visit Diagnoses         Codes    Routine general medical examination at health care facility    -  Primary Z00.00

## 2024-08-09 NOTE — PATIENT INSTRUCTIONS
It was a pleasure seeing you in the office today.  We will follow up on all comprehensive blood work once results are available and make any recommendations based on these results as may be indicated.  Please consider scheduling your bone density scan and mammogram as well.  If you need any refills or have any additional questions, please feel free to contact us.  Otherwise, we are happy to see back in 6 months for brief follow-up.

## 2024-08-12 DIAGNOSIS — I10 PRIMARY HYPERTENSION: Primary | ICD-10-CM

## 2024-08-12 RX ORDER — LOSARTAN POTASSIUM 100 MG/1
100 TABLET ORAL DAILY
Qty: 90 TABLET | Refills: 1 | Status: SHIPPED | OUTPATIENT
Start: 2024-08-12

## 2024-09-04 ENCOUNTER — HOSPITAL ENCOUNTER (OUTPATIENT)
Dept: RADIOLOGY | Facility: CLINIC | Age: 84
Discharge: HOME | End: 2024-09-04
Payer: MEDICARE

## 2024-09-04 ENCOUNTER — APPOINTMENT (OUTPATIENT)
Dept: INTEGRATIVE MEDICINE | Facility: CLINIC | Age: 84
End: 2024-09-04
Payer: MEDICARE

## 2024-09-04 DIAGNOSIS — Z12.31 ENCOUNTER FOR SCREENING MAMMOGRAM FOR MALIGNANT NEOPLASM OF BREAST: ICD-10-CM

## 2024-09-04 PROCEDURE — 77067 SCR MAMMO BI INCL CAD: CPT

## 2024-09-04 PROCEDURE — 77063 BREAST TOMOSYNTHESIS BI: CPT | Performed by: RADIOLOGY

## 2024-09-04 PROCEDURE — 77067 SCR MAMMO BI INCL CAD: CPT | Performed by: RADIOLOGY

## 2024-09-05 DIAGNOSIS — R92.8 ABNORMAL SCREENING MAMMOGRAM: Primary | ICD-10-CM

## 2024-09-12 ENCOUNTER — HOSPITAL ENCOUNTER (OUTPATIENT)
Dept: RADIOLOGY | Facility: CLINIC | Age: 84
Discharge: HOME | End: 2024-09-12
Payer: MEDICARE

## 2024-09-12 DIAGNOSIS — R92.8 ABNORMAL SCREENING MAMMOGRAM: ICD-10-CM

## 2024-09-12 PROCEDURE — 77061 BREAST TOMOSYNTHESIS UNI: CPT | Mod: RT

## 2024-09-12 PROCEDURE — G0279 TOMOSYNTHESIS, MAMMO: HCPCS | Mod: RIGHT SIDE | Performed by: STUDENT IN AN ORGANIZED HEALTH CARE EDUCATION/TRAINING PROGRAM

## 2024-09-12 PROCEDURE — 77065 DX MAMMO INCL CAD UNI: CPT | Mod: RIGHT SIDE | Performed by: STUDENT IN AN ORGANIZED HEALTH CARE EDUCATION/TRAINING PROGRAM

## 2024-10-11 ENCOUNTER — HOSPITAL ENCOUNTER (OUTPATIENT)
Dept: RADIOLOGY | Facility: CLINIC | Age: 84
Discharge: HOME | End: 2024-10-11
Payer: MEDICARE

## 2024-10-11 DIAGNOSIS — Z78.0 POSTMENOPAUSAL ESTROGEN DEFICIENCY: ICD-10-CM

## 2024-10-11 PROCEDURE — 77080 DXA BONE DENSITY AXIAL: CPT

## 2024-10-14 ENCOUNTER — APPOINTMENT (OUTPATIENT)
Dept: RADIOLOGY | Facility: CLINIC | Age: 84
End: 2024-10-14
Payer: MEDICARE

## 2024-11-07 ENCOUNTER — TELEPHONE (OUTPATIENT)
Dept: PRIMARY CARE | Facility: CLINIC | Age: 84
End: 2024-11-07

## 2025-02-03 DIAGNOSIS — R10.13 EPIGASTRIC ABDOMINAL PAIN: ICD-10-CM

## 2025-02-03 DIAGNOSIS — I10 PRIMARY HYPERTENSION: ICD-10-CM

## 2025-02-03 DIAGNOSIS — N95.2 ATROPHIC VAGINITIS: ICD-10-CM

## 2025-02-03 DIAGNOSIS — N90.89 LESION OF VULVA: Primary | ICD-10-CM

## 2025-02-03 RX ORDER — BETAMETHASONE VALERATE 1 MG/G
CREAM TOPICAL 2 TIMES DAILY PRN
Qty: 15 G | Refills: 0 | Status: SHIPPED | OUTPATIENT
Start: 2025-02-03

## 2025-02-03 RX ORDER — OMEPRAZOLE 20 MG/1
20 CAPSULE, DELAYED RELEASE ORAL EVERY MORNING
Qty: 90 CAPSULE | Refills: 2 | Status: SHIPPED | OUTPATIENT
Start: 2025-02-03

## 2025-02-03 RX ORDER — LOSARTAN POTASSIUM 100 MG/1
100 TABLET ORAL DAILY
Qty: 90 TABLET | Refills: 0 | Status: SHIPPED | OUTPATIENT
Start: 2025-02-03

## 2025-02-11 ENCOUNTER — APPOINTMENT (OUTPATIENT)
Dept: PRIMARY CARE | Facility: CLINIC | Age: 85
End: 2025-02-11
Payer: MEDICARE

## 2025-02-11 VITALS
DIASTOLIC BLOOD PRESSURE: 74 MMHG | SYSTOLIC BLOOD PRESSURE: 146 MMHG | HEART RATE: 71 BPM | BODY MASS INDEX: 23.02 KG/M2 | WEIGHT: 106.4 LBS

## 2025-02-11 DIAGNOSIS — D72.819 LEUKOPENIA, UNSPECIFIED TYPE: ICD-10-CM

## 2025-02-11 DIAGNOSIS — R42 VERTIGO: ICD-10-CM

## 2025-02-11 DIAGNOSIS — I10 PRIMARY HYPERTENSION: Primary | ICD-10-CM

## 2025-02-11 DIAGNOSIS — R93.1 ELEVATED CORONARY ARTERY CALCIUM SCORE: ICD-10-CM

## 2025-02-11 DIAGNOSIS — H69.92 DYSFUNCTION OF LEFT EUSTACHIAN TUBE: ICD-10-CM

## 2025-02-11 DIAGNOSIS — E78.5 DYSLIPIDEMIA: ICD-10-CM

## 2025-02-11 LAB
ALBUMIN SERPL-MCNC: 4.7 G/DL (ref 3.6–5.1)
ALP SERPL-CCNC: 98 U/L (ref 37–153)
ALT SERPL-CCNC: 14 U/L (ref 6–29)
ANION GAP SERPL CALCULATED.4IONS-SCNC: 7 MMOL/L (CALC) (ref 7–17)
AST SERPL-CCNC: 23 U/L (ref 10–35)
BASOPHILS # BLD AUTO: 22 CELLS/UL (ref 0–200)
BASOPHILS NFR BLD AUTO: 0.5 %
BILIRUB SERPL-MCNC: 0.8 MG/DL (ref 0.2–1.2)
BUN SERPL-MCNC: 17 MG/DL (ref 7–25)
CALCIUM SERPL-MCNC: 10.2 MG/DL (ref 8.6–10.4)
CHLORIDE SERPL-SCNC: 98 MMOL/L (ref 98–110)
CHOLEST SERPL-MCNC: 215 MG/DL
CHOLEST/HDLC SERPL: 3.2 (CALC)
CK SERPL-CCNC: 59 U/L (ref 16–215)
CO2 SERPL-SCNC: 30 MMOL/L (ref 20–32)
CREAT SERPL-MCNC: 0.88 MG/DL (ref 0.6–0.95)
EGFRCR SERPLBLD CKD-EPI 2021: 65 ML/MIN/1.73M2
EOSINOPHIL # BLD AUTO: 60 CELLS/UL (ref 15–500)
EOSINOPHIL NFR BLD AUTO: 1.4 %
ERYTHROCYTE [DISTWIDTH] IN BLOOD BY AUTOMATED COUNT: 13.1 % (ref 11–15)
GLUCOSE SERPL-MCNC: 99 MG/DL (ref 65–139)
HCT VFR BLD AUTO: 41.3 % (ref 35–45)
HDLC SERPL-MCNC: 67 MG/DL
HGB BLD-MCNC: 13.5 G/DL (ref 11.7–15.5)
LDLC SERPL CALC-MCNC: 128 MG/DL (CALC)
LYMPHOCYTES # BLD AUTO: 1127 CELLS/UL (ref 850–3900)
LYMPHOCYTES NFR BLD AUTO: 26.2 %
MCH RBC QN AUTO: 30.7 PG (ref 27–33)
MCHC RBC AUTO-ENTMCNC: 32.7 G/DL (ref 32–36)
MCV RBC AUTO: 93.9 FL (ref 80–100)
MONOCYTES # BLD AUTO: 357 CELLS/UL (ref 200–950)
MONOCYTES NFR BLD AUTO: 8.3 %
NEUTROPHILS # BLD AUTO: 2735 CELLS/UL (ref 1500–7800)
NEUTROPHILS NFR BLD AUTO: 63.6 %
NONHDLC SERPL-MCNC: 148 MG/DL (CALC)
PLATELET # BLD AUTO: 322 THOUSAND/UL (ref 140–400)
PMV BLD REES-ECKER: 9.9 FL (ref 7.5–12.5)
POTASSIUM SERPL-SCNC: 4.9 MMOL/L (ref 3.5–5.3)
PROT SERPL-MCNC: 7.6 G/DL (ref 6.1–8.1)
RBC # BLD AUTO: 4.4 MILLION/UL (ref 3.8–5.1)
SODIUM SERPL-SCNC: 135 MMOL/L (ref 135–146)
TRIGL SERPL-MCNC: 92 MG/DL
WBC # BLD AUTO: 4.3 THOUSAND/UL (ref 3.8–10.8)

## 2025-02-11 PROCEDURE — 3078F DIAST BP <80 MM HG: CPT | Performed by: INTERNAL MEDICINE

## 2025-02-11 PROCEDURE — 99214 OFFICE O/P EST MOD 30 MIN: CPT | Performed by: INTERNAL MEDICINE

## 2025-02-11 PROCEDURE — 1036F TOBACCO NON-USER: CPT | Performed by: INTERNAL MEDICINE

## 2025-02-11 PROCEDURE — 3077F SYST BP >= 140 MM HG: CPT | Performed by: INTERNAL MEDICINE

## 2025-02-11 PROCEDURE — 1160F RVW MEDS BY RX/DR IN RCRD: CPT | Performed by: INTERNAL MEDICINE

## 2025-02-11 PROCEDURE — 1159F MED LIST DOCD IN RCRD: CPT | Performed by: INTERNAL MEDICINE

## 2025-02-11 PROCEDURE — 1123F ACP DISCUSS/DSCN MKR DOCD: CPT | Performed by: INTERNAL MEDICINE

## 2025-02-11 ASSESSMENT — ENCOUNTER SYMPTOMS
SORE THROAT: 0
WEAKNESS: 0
COUGH: 0
CHEST TIGHTNESS: 0
WHEEZING: 0
FEVER: 0
SINUS PRESSURE: 0
ABDOMINAL PAIN: 0
SINUS PAIN: 0
SHORTNESS OF BREATH: 0
PALPITATIONS: 0
ABDOMINAL DISTENTION: 0
ACTIVITY CHANGE: 0
DIZZINESS: 1
DIARRHEA: 0
CONSTIPATION: 0
CHILLS: 0
FATIGUE: 0
HEADACHES: 0
LIGHT-HEADEDNESS: 0

## 2025-02-11 NOTE — PATIENT INSTRUCTIONS
It was a pleasure seeing you in the office today.  We will follow up on all comprehensive blood work once results are available and make any recommendations based on these results as may be indicated.  I would consider adding fluticasone, or Flonase, nasal spray over-the-counter 2 sprays in each nostril once daily.  Please continue on your additional medications with no change and contact us if you should need any refills.  Please call with any questions or concerns.  If all remains well, we are happy to see you back in 6 months for your wellness visit.

## 2025-02-11 NOTE — PROGRESS NOTES
Subjective   Patient ID: Desiree Cochran is a 84 y.o. female who presents for comprehensive follow up.    Ms. Cochran is in today for comprehensive 6-month follow-up.  She is feeling reasonably well.  She has had some increase in vertigo symptoms, with a sensation that her ears have been stopped up.  She feels that she has had dry her skin this winter.  Blood pressures at home are outstanding, typically running in the 120s/60s range.  She denies any headaches, chest pain or palpitations, shortness of breath nor cough, nausea, vomiting, nor changes in bowel nor bladder habits.  She continues on her medications with no change.  She is amenable to updating lab work today, not fully fasting but with a light breakfast about 3 hours ago.        Review of Systems   Constitutional:  Negative for activity change, chills, fatigue and fever.   HENT:  Positive for congestion. Negative for ear discharge, ear pain, sinus pressure, sinus pain, sore throat and tinnitus.    Respiratory:  Negative for cough, chest tightness, shortness of breath and wheezing.    Cardiovascular:  Negative for chest pain, palpitations and leg swelling.   Gastrointestinal:  Negative for abdominal distention, abdominal pain, constipation and diarrhea.   Neurological:  Positive for dizziness. Negative for weakness, light-headedness and headaches.       Objective   Physical Exam  Constitutional:       Appearance: Normal appearance.   HENT:      Right Ear: Tympanic membrane and ear canal normal. There is no impacted cerumen.      Left Ear: Tympanic membrane normal. There is no impacted cerumen.      Nose: Nose normal. No congestion.   Cardiovascular:      Rate and Rhythm: Normal rate and regular rhythm.      Pulses: Normal pulses.      Heart sounds: Normal heart sounds. No murmur heard.     No gallop.   Pulmonary:      Effort: Pulmonary effort is normal. No respiratory distress.      Breath sounds: Normal breath sounds. No wheezing, rhonchi or rales.   Abdominal:       General: There is no distension.      Tenderness: There is no abdominal tenderness. There is no guarding or rebound.   Musculoskeletal:      Cervical back: No tenderness.      Right lower leg: No edema.      Left lower leg: No edema.   Lymphadenopathy:      Cervical: No cervical adenopathy.   Neurological:      Mental Status: She is alert.         Assessment/Plan        1.  Hypertension: Much better control on second check.  Running excellent at home.  Contact us if refills needed.  Update BMP.  2.  Hyperlipidemia with elevated coronary calcium scoring scan: Continues on statin therapy.  Low-dose.  Update labs.  Not fully fasting today.  3.  Consistent vertigo with eustachian tube dysfunction: Have recommended starting back on fluticasone nasal spray.  4.  Leukopenia: Continue monitoring CBC, has recently remained stable.    If all remains stable, we will see her back in 6 months for her wellness visit.  She is to call with any questions or concerns prior to that time.      Betsy Schneider MD 02/11/25 9:09 AM

## 2025-03-25 ENCOUNTER — TELEPHONE (OUTPATIENT)
Dept: PRIMARY CARE | Facility: CLINIC | Age: 85
End: 2025-03-25

## 2025-03-26 DIAGNOSIS — R42 VERTIGO: Primary | ICD-10-CM

## 2025-04-10 ENCOUNTER — OFFICE VISIT (OUTPATIENT)
Dept: CARDIOLOGY | Facility: HOSPITAL | Age: 85
End: 2025-04-10
Payer: MEDICARE

## 2025-04-10 VITALS
BODY MASS INDEX: 22.44 KG/M2 | WEIGHT: 104 LBS | OXYGEN SATURATION: 100 % | DIASTOLIC BLOOD PRESSURE: 83 MMHG | HEIGHT: 57 IN | HEART RATE: 87 BPM | SYSTOLIC BLOOD PRESSURE: 178 MMHG

## 2025-04-10 DIAGNOSIS — I10 HTN (HYPERTENSION): ICD-10-CM

## 2025-04-10 DIAGNOSIS — I35.1 AORTIC REGURGITATION: Primary | ICD-10-CM

## 2025-04-10 PROCEDURE — 3077F SYST BP >= 140 MM HG: CPT | Performed by: HOSPITALIST

## 2025-04-10 PROCEDURE — 99213 OFFICE O/P EST LOW 20 MIN: CPT | Performed by: HOSPITALIST

## 2025-04-10 PROCEDURE — 1123F ACP DISCUSS/DSCN MKR DOCD: CPT | Performed by: HOSPITALIST

## 2025-04-10 PROCEDURE — 1159F MED LIST DOCD IN RCRD: CPT | Performed by: HOSPITALIST

## 2025-04-10 PROCEDURE — 3078F DIAST BP <80 MM HG: CPT | Performed by: HOSPITALIST

## 2025-04-10 PROCEDURE — 1036F TOBACCO NON-USER: CPT | Performed by: HOSPITALIST

## 2025-04-10 PROCEDURE — 1160F RVW MEDS BY RX/DR IN RCRD: CPT | Performed by: HOSPITALIST

## 2025-04-10 ASSESSMENT — COLUMBIA-SUICIDE SEVERITY RATING SCALE - C-SSRS
2. HAVE YOU ACTUALLY HAD ANY THOUGHTS OF KILLING YOURSELF?: NO
6. HAVE YOU EVER DONE ANYTHING, STARTED TO DO ANYTHING, OR PREPARED TO DO ANYTHING TO END YOUR LIFE?: NO
1. IN THE PAST MONTH, HAVE YOU WISHED YOU WERE DEAD OR WISHED YOU COULD GO TO SLEEP AND NOT WAKE UP?: NO

## 2025-04-10 NOTE — PATIENT INSTRUCTIONS
Thank you so much for visiting us today.    Will see you back in 1 year, please call us at [phone] if you have any questions.

## 2025-04-10 NOTE — PROGRESS NOTES
Subjective   Desiree Cochran is a 84 y.o. female with PMH of HTN, HLD, and mild to moderate AI, who is here for routine follow-up.  Patient is physically active for her age, she continues to walk total of 1 mile inside her house without any breaks every single day, she also uses the stairs to her basement few times a week.  She denies any chest pain or short of breath with these activities.  She may feel fatigued after walking 1 mile.  No orthopnea, PND, dizziness, or palpitation.  She never smoked.  Patient is on Simvastatin 10, Amlodipine 5 mg, and Losartan 100 mg.  Her last LDL 98.  Today's blood pressure 163/77, heart rate 87.  Patient has a log of her home BP measurements, she is usually 105-120/60-70 mmHg.    TTE 4/18/2024:   1. Left ventricular systolic function is normal with a 65-70% estimated ejection fraction.   2. Slightly elevated RVSP.   3. Trivial aortic regurgitation. Mild AI at most on my review.     EKG 8/1/2023: EKG shows normal sinus rhythm, no ST segment abnormalities      Coronary calcium score 6/24/2020:  Coronary artery calcium score of 89*. LAD 84, LCX 5.     TTE 6/24/2020:   1. The left ventricular systolic function is normal with a 70-75% estimated ejection fraction.   2. There is no evidence of left ventricular hypertrophy.   3. Slightly elevated RVSP.   4. There is mild to moderate aortic valve regurgitation.     Review of Systems  ROS is negative other than in HPI.      Objective   Physical Exam  General: NAD  HEENT: IEOM, PERRL   Neck: No JVD or carotid bruit  Lungs: CTAB  Heart: RRR, normal S1 and S2, no loud murmurs  Abdomen: Soft, nontender, positive bowel sounds  Extremities: No edema  Neurologic: No FND  Psychiatric: Normal mood and affect    Assessment/Plan   1-mild to moderate AI:  -No shortness of breath or chest pain, she may feel little fatigued after walking 1 mile.  -TTE on 4/18/2024 with reported trace AI, mild at most on my review.    -Serial echocardiograms every 3 years or  earlier if any symptoms.  Will order one now.  -Continue good BP control with current medications.     2-HTN:  -See HPI for details, controlled, continue current medications.     3-HLD:  -Last LDL was 98, continue current simvastatin 10 mg once daily.     4-elevated coronary calcium score:  -Coronary calcium score 6/24/2020 was elevated at 89*; LAD 84, LCX 5.  -Patient is physically active for age without any cardiovascular symptoms.  -No indication for further cardiac testing.  -Continue simvastatin.  No indication for aspirin.     RTC in 1 year.     Silvia Blanco MD

## 2025-04-16 ENCOUNTER — CLINICAL SUPPORT (OUTPATIENT)
Dept: PHYSICAL THERAPY | Facility: CLINIC | Age: 85
End: 2025-04-16
Payer: MEDICARE

## 2025-04-16 DIAGNOSIS — R42 VERTIGO: ICD-10-CM

## 2025-04-24 ENCOUNTER — TREATMENT (OUTPATIENT)
Dept: PHYSICAL THERAPY | Facility: CLINIC | Age: 85
End: 2025-04-24
Payer: MEDICARE

## 2025-04-24 DIAGNOSIS — R42 VERTIGO: ICD-10-CM

## 2025-04-24 PROCEDURE — 95992 CANALITH REPOSITIONING PROC: CPT | Mod: GP | Performed by: PHYSICAL THERAPIST

## 2025-04-24 PROCEDURE — 97140 MANUAL THERAPY 1/> REGIONS: CPT | Mod: GP | Performed by: PHYSICAL THERAPIST

## 2025-04-24 NOTE — PROGRESS NOTES
Subjective: _Patient seen and assessed, cellulitis improving, plan on discharge on oral antibiotic per ID when he is ready  @  Vitals: _ Vital Signs (last 24 hrs)_____ Last Charted___________Minimum____________ Maximum____________  Temp    98.4  (OCT 25 11:40) 97.8  (OCT 25 03:59) H 99.7 (OCT 24 20:03)  Heart Rate   87  (OCT 25 11:40) 84  (OCT 25 07:58) 95  (OCT 24 16:01)  Resp Rate       16  (OCT 25 11:40) 16  (OCT 25 11:40) 20  (OCT 24 20:03)  SBP    129  (OCT 25 11:40) 123  (OCT 24 20:03) 137  (OCT 24 16:01)  DBP    85  (OCT 25 11:40) 77  (OCT 24 20:03) 86  (OCT 24 16:01)      Physical Exam:   General: Well developded  Skin: dry, moist   HEENT: normal   Hear: S1, S2 regular   Lungs: Clear, no wheezing or rales   Abdomen: BS present no tenderness onf palpation  Extremities: Left lower extremity cellulitis improving from yesterday, and getting away from the demarcated line  Neurologic: No gross focal deficit, AAO  @  Labs (Last four charted values)  WBC                  6.6 (OCT 25) 8.2 (OCT 24) H 11.2 (OCT 23)   Hgb                  13.3 (OCT 25) 12.8 (OCT 24) 14.6 (OCT 23)   Hct                  41 (OCT 25) 39 (OCT 24) 43 (OCT 23)   Plt                  L 138 (OCT 25) L 133 (OCT 24) L 130 (OCT 23)  Na                   137 (OCT 25) 136 (OCT 24) L 133 (OCT 23)   K                    4.1 (OCT 25) 3.7 (OCT 24) 4.2 (OCT 23)   CO2                  25 (OCT 25) 24 (OCT 24) 23 (OCT 23)   Cl                   104 (OCT 25) 103 (OCT 24) 99 (OCT 23)   Cr                   0.79 (OCT 25) 0.88 (OCT 24) 1.02 (OCT 23)   BUN                  12 (OCT 25) 14 (OCT 24) 17 (OCT 23)   Glucose              H 104 (OCT 25) 98 (OCT 24) H 102 (OCT 23)   Ca                   8.9 (OCT 25) 8.5 (OCT 24) 9.0 (OCT 23)     RADIOLOGY DATA:    X-ray of left foot, impression:  No acute fracture or dislocation.  Degenerative spurring is seen at the ligamentous insertions.  No osseous erosions are identified.  There is prominent soft tissue swelling  Vestibular Physical Therapy Initial Examination Note    Patient Name: Desiree Cochran  MRN Number: [id number]  Initial Examination Date: 4/24/2025  Referring Clinician: Betsy Schneider MD  Reason for Visit: Vertigo    Insurance  Visit Number: 2   Approved Visits: 5 visits  Certification/ POC Period: 4/21/25 - 6/19/25  Coverage: Payor: [employer] / Plan: [employer] MEDICARE ADVANTAGE / Product Type: *No Product type* /     Precautions/ History  Right side sciatica, some numbness.     Problem List  Problem List Items Addressed This Visit           ICD-10-CM    Vertigo R42     Subjective  Vertigo goes and comes. Since February she has been dizzy all the time. Dizziness 20 years or more, medication then. The first instance of dizziness, laid in bed and room was moving. Laying on her left she has movement. She has pain in her left ear, bothers her all the time. Bending forward, feels like she is going to fall over. She feels off balance. Did have a fall April of last year. No treatment for dizziness so far. Increasing water intake, did not make a change. Arthritis in her neck, stiffness and pain on the outside, hurts when she turns her head. Drives, short distances. Avoids stairs, lightheaded and off balance, fear of falling down stairs. Goes down stairs backwards since dizziness. Does not sew as much because her head has to look downwards. Cannot quickly move her head. Lives with her family, daughter and grand kids.     Objective  Canalith Testing Left Right Comments   Posterior Canal  none none loaded celi-hallpike   Horizontal Canal not tested not tested not tested     Treatment  Canalith Repositioning   - preformed above testing again    Manual Therapy   - passive neck ROM, side bending, rotation   - soft tissue mobilization, left sub-occipital down to cervical paraspinals    Neuromuscular Reeducation   - walking with head nods   - walking with head turns   - gaze stabilization horizontal    Assessment  Improvements in  along the dorsal aspect of the foot.  Ultrasound venous Doppler of left lower extremity done yesterday, impression:  No evidence of DVT.     EKG: sinus tachycardia.    ASSESSMENT AND PLAN:  A 38-year-old gentleman with no significant past medical history, presenting for evaluation of worsening left lower extremity infection.  1. Left lower extremity cellulitis:  Failed outpatient antibiotic therapy.  No deep venous thrombosis on US.  No fluctuance on exam.  Started IV cefazolin.  Cellulitis improving.  ID on consult appreciate recommendation, continuing cefazolin, plan to discharge on oral antibiotic, possibly tomorrow  2. Systemic inflammatory response syndrome positive:  Febrile and tachycardic.  Blood pressure stable.  Lactic acid normal. Unable to determine if sepsis present.  Blood culture sent.  On IV fluids and antibiotics.  3. Leukocytosis/resolved:  Secondary to #1.  On IV antibiotics.  4. Thrombocytopenia:  Likely due to infectious process.  Platelets 130-130.  Monitor.  5. Hyponatremia:  Mild.  Sodium 133.  Likely mild hypovolemia.  On IV fluids.  6. Deep venous thrombosis prophylaxis:  SCDs and subcutaneous Lovenox.      Current Medications: _ Medications (6) Active  Scheduled: (3)  ceFAZolin  2,000 mg 15 mL, IV Push, q8hr  enoxaparin 40 mg/0.4 mL Syringe  40 mg 0.4 mL, SubQ, qDay  loratadine 10 mg Tab  10 mg 1 tab, PO, qDay  Continuous: (0)  PRN: (3)  acetaminophen 325 mg Tab  650 mg 2 tab, PO, q4hr  ondansetron 4 mg/2 mL Vial  4 mg 2 mL, IV Push, q4hr  traMADol 50 mg Tab  50 mg 1 tab, PO, q6hr      have reviewing records and labs, reviewing radiographs and other studies, direct examining of patient,  educating patient, discussing with house staff treatment plan, discussing with family and communicating with other consultants          Electronically Signed On 10.25.2020 13:00  ___________________________________________________   Jesus Ashraf MD   walking following standing stretching. Subjective reduction in shoulder pain.     Problem List: Functional Limitations/ Difficulties, Dizziness Handicap, Dizziness, Limitations in Cervical AROM, and Postural Instability/ Fall Risk    Plan  Can assess passive accessory movement.    Planned interventions include: Patient education, Home exercise program, Therapeutic exercise , Manual therapy, and Neuromuscular re-education    1 visit / Week for 8 weeks    Home Program  Access Code: [email or web address]  URL: [email or web address]  Date: 04/24/2025  Prepared by: Chinmay Manning    Exercises  - Walking with Full Head Turns  - 1-2 x daily - 7 x weekly - 1-2 minute  - Walking with Head Nod  - 1-2 x daily - 7 x weekly - 1-2 minute  - Gaze Stability (VOR) x1 Feet Together on Firm Ground With Horizontal Head Turns  - 1-2 x daily - 7 x weekly - 1-2 minutes  - Seated Cervical Rotation AROM  - 1-2 x daily - 7 x weekly - 10 reps - 5-10 hold  - Seated Cervical Sidebending AROM  - 1-2 x daily - 7 x weekly - 10 reps - 5-10 sec hold  - Seated Cervical Flexion AROM  - 1-2 x daily - 7 x weekly - 10 reps - 5-10 sec hold    Goals  - Full return to prior level of function to allow continued working capability.  - Reduce DHI outcome measure goal to less than or equal to 15 for meaningful and clinical improvements in dizziness condition.  - Full resolution of dizziness to improve quality of life.  - Complete resolution of postural dizziness for improved ability to sleep, transfer in bed, bend down and look upwards.  - Patient to have no falls and negative test on condition 7 within ALISIA SOP testing to improve postural control and balance.  - Increase cervical AROM greater than or equal to 60° into flexion, 35° into extension, 30° bilateral into side bending and 55° bilateral into rotation without pain for ease of gazing while driving and neck movement during chores.  - Patient to complete VOR x1 on stable surface, feet shoulder  width apart at 100 bpm for 1 minute without onset of symptoms for improve VOR function and reduced contribution to overall dizziness.    Plan of care developed in agreement with patient.     Personal Factors Affecting Care: Comorbidities  PT Clinical Presentation: Evolving with changing characteristics  Rehab Potential: Good       Screening  Frequency  Date Last Completed   Spiritual and Cultural Beliefs   Screening  each visit or episode of care 4/10/2025   Falls Risk Screening  every ambulatory visit    Pain Screening  annually at primary care visit  1/26/2023   Domestic Violence screening  annually at primary care visit 4/10/2025   Elder Abuse Screening  annually at primary care visit 4/10/2025   Depression Screening  annually in the primary care setting 8/9/2024   Suicide Risk Screening  annually in the primary care setting 4/10/2025   Nutrition and Food Insecurity   Screening  at least annually at primary care visit     Key Learner  annually in the primary care setting 4/10/2025   Drug Screen  2/11/2025  9:10 AM   Alcohol Screen  2/11/2025  9:10 AM   Advance Directive  4/10/2025          Time Calculation  Start Time: 0734  Stop Time: 0816  Time Calculation (min): 42 min     PT Therapeutic Procedures Time Entry  Manual Therapy Time Entry: 25  Neuromuscular Re-Education Time Entry: 9   PT Modalities Time Entry  Canalith Repositioning Time Entry: 5

## 2025-04-29 ENCOUNTER — TREATMENT (OUTPATIENT)
Dept: PHYSICAL THERAPY | Facility: CLINIC | Age: 85
End: 2025-04-29
Payer: MEDICARE

## 2025-04-29 DIAGNOSIS — R42 VERTIGO: ICD-10-CM

## 2025-04-29 PROCEDURE — 97140 MANUAL THERAPY 1/> REGIONS: CPT | Mod: GP | Performed by: PHYSICAL THERAPIST

## 2025-04-29 PROCEDURE — 97112 NEUROMUSCULAR REEDUCATION: CPT | Mod: GP | Performed by: PHYSICAL THERAPIST

## 2025-04-29 NOTE — PROGRESS NOTES
Vestibular Physical Therapy Initial Examination Note    Patient Name: Desiree Cochran  MRN Number: [id number]  Initial Examination Date: 4/29/2025  Referring Clinician: Betsy Schneider MD  Reason for Visit: Vertigo    Insurance  Visit Number: 3   Approved Visits: 5 visits  Certification/ POC Period: 4/21/25 - 6/19/25  Coverage: Payor: ANTH MEDICARE / Plan: [employer] MEDICARE ADVANTAGE / Product Type: *No Product type* /     Precautions/ History  Right side sciatica, some numbness.     Problem List  Problem List Items Addressed This Visit           ICD-10-CM    Vertigo R42     Subjective  Still having dizziness is she moves too fast. Has been working on the exercises at home. She feels like her neck is doing better. Dizziness is not too much better since starting PT.    Treatment  Manual Therapy   - passive neck ROM, side bending, rotation   - soft tissue mobilization, left sub-occipital down to cervical paraspinals    Neuromuscular Reeducation   - gaze stabilization horizontal x1 min, 60 bpm   - gaze stabilization horizontal x1 min, 80 bpm    - instructed to perform as part of HEP    Assessment  Provocation of dizziness with quick impulse head movement.     Problem List: Functional Limitations/ Difficulties, Dizziness Handicap, Dizziness, Limitations in Cervical AROM, and Postural Instability/ Fall Risk    Plan  Can assess passive accessory movement.    Planned interventions include: Patient education, Home exercise program, Therapeutic exercise , Manual therapy, and Neuromuscular re-education    1 visit / Week for 8 weeks    Home Program  Access Code: [email or web address]  URL: [email or web address]  Date: 04/24/2025  Prepared by: Chinmay Manning    Exercises  - Walking with Full Head Turns  - 1-2 x daily - 7 x weekly - 1-2 minute  - Walking with Head Nod  - 1-2 x daily - 7 x weekly - 1-2 minute  - Gaze Stability (VOR) x1 Feet Together on Firm Ground With Horizontal Head Turns  - 1-2 x daily - 7 x weekly - 1-2  minutes  - Seated Cervical Rotation AROM  - 1-2 x daily - 7 x weekly - 10 reps - 5-10 hold  - Seated Cervical Sidebending AROM  - 1-2 x daily - 7 x weekly - 10 reps - 5-10 sec hold  - Seated Cervical Flexion AROM  - 1-2 x daily - 7 x weekly - 10 reps - 5-10 sec hold    Goals  - Full return to prior level of function to allow continued working capability.  - Reduce DHI outcome measure goal to less than or equal to 15 for meaningful and clinical improvements in dizziness condition.  - Full resolution of dizziness to improve quality of life.  - Complete resolution of postural dizziness for improved ability to sleep, transfer in bed, bend down and look upwards.  - Patient to have no falls and negative test on condition 7 within ALISIA SOP testing to improve postural control and balance.  - Increase cervical AROM greater than or equal to 60° into flexion, 35° into extension, 30° bilateral into side bending and 55° bilateral into rotation without pain for ease of gazing while driving and neck movement during chores.  - Patient to complete VOR x1 on stable surface, feet shoulder width apart at 100 bpm for 1 minute without onset of symptoms for improve VOR function and reduced contribution to overall dizziness.    Plan of care developed in agreement with patient.     Personal Factors Affecting Care: Comorbidities  PT Clinical Presentation: Evolving with changing characteristics  Rehab Potential: Good       Screening  Frequency  Date Last Completed   Spiritual and Cultural Beliefs   Screening  each visit or episode of care 4/10/2025   Falls Risk Screening  every ambulatory visit    Pain Screening  annually at primary care visit  1/26/2023   Domestic Violence screening  annually at primary care visit 4/10/2025   Elder Abuse Screening  annually at primary care visit 4/10/2025   Depression Screening  annually in the primary care setting 8/9/2024   Suicide Risk Screening  annually in the primary care setting 4/10/2025   Nutrition  and Food Insecurity   Screening  at least annually at primary care visit     Key Learner  annually in the primary care setting 4/10/2025   Drug Screen  2/11/2025  9:10 AM   Alcohol Screen  2/11/2025  9:10 AM   Advance Directive  4/10/2025          Time Calculation  Start Time: 0147  Stop Time: 0228  Time Calculation (min): 41 min     PT Therapeutic Procedures Time Entry  Manual Therapy Time Entry: 20  Neuromuscular Re-Education Time Entry: 19

## 2025-04-30 ENCOUNTER — TELEPHONE (OUTPATIENT)
Dept: PRIMARY CARE | Facility: CLINIC | Age: 85
End: 2025-04-30

## 2025-04-30 DIAGNOSIS — I10 PRIMARY HYPERTENSION: ICD-10-CM

## 2025-05-05 RX ORDER — LOSARTAN POTASSIUM 100 MG/1
100 TABLET ORAL DAILY
Qty: 90 TABLET | Refills: 0 | Status: CANCELLED | OUTPATIENT
Start: 2025-05-05

## 2025-05-06 ENCOUNTER — TREATMENT (OUTPATIENT)
Dept: PHYSICAL THERAPY | Facility: CLINIC | Age: 85
End: 2025-05-06
Payer: MEDICARE

## 2025-05-06 DIAGNOSIS — M54.2 CERVICALGIA: Primary | ICD-10-CM

## 2025-05-06 DIAGNOSIS — R42 VERTIGO: ICD-10-CM

## 2025-05-06 PROCEDURE — 97140 MANUAL THERAPY 1/> REGIONS: CPT | Mod: GP | Performed by: PHYSICAL THERAPIST

## 2025-05-06 PROCEDURE — 97112 NEUROMUSCULAR REEDUCATION: CPT | Mod: GP | Performed by: PHYSICAL THERAPIST

## 2025-05-06 NOTE — PROGRESS NOTES
Vestibular Physical Therapy Initial Examination Note    Patient Name: Desiree Cochran  MRN Number: [id number]  Initial Examination Date: 5/6/2025  Referring Clinician: Betsy Schneider MD  Reason for Visit: Vertigo    Insurance  Visit Number: 4   Approved Visits: 5 visits  Certification/ POC Period: 4/21/25 - 6/19/25  Coverage: Payor: ANTH MEDICARE / Plan: [employer] MEDICARE ADVANTAGE / Product Type: *No Product type* /     Precautions/ History  Right side sciatica, some numbness.     Problem List  Problem List Items Addressed This Visit           ICD-10-CM    Vertigo R42    Cervicalgia - Primary M54.2     Subjective  Dizziness and neck stiffness are starting to improve.    Treatment  Manual Therapy   - passive neck ROM, side bending, rotation   - soft tissue mobilization, left sub-occipital down to cervical paraspinals    Neuromuscular Reeducation   - gaze stabilization horizontal 2x1 min, 80 bpm   - gaze stabilization horizontal 1x1 min, 90 bpm    - instructed to perform as part of HEP    - 90 bpm, progressing to 100 bpm   - discussed HEP next week   - turning in place, quick turns alternating directions, 4 full turns     Assessment  Provocation of dizziness with quick turns, alternating directions.     Problem List: Functional Limitations/ Difficulties, Dizziness Handicap, Dizziness, Limitations in Cervical AROM, and Postural Instability/ Fall Risk    Plan  Can assess passive accessory movement.    Planned interventions include: Patient education, Home exercise program, Therapeutic exercise , Manual therapy, and Neuromuscular re-education    1 visit / Week for 8 weeks    Home Program  Access Code: [email or web address]  URL: [email or web address]  Date: 04/24/2025  Prepared by: Chinmay Manning    Exercises  - Walking with Full Head Turns  - 1-2 x daily - 7 x weekly - 1-2 minute  - Walking with Head Nod  - 1-2 x daily - 7 x weekly - 1-2 minute  - Gaze Stability (VOR) x1 Feet Together on Firm Ground With Horizontal  Head Turns  - 1-2 x daily - 7 x weekly - 1-2 minutes  - Seated Cervical Rotation AROM  - 1-2 x daily - 7 x weekly - 10 reps - 5-10 hold  - Seated Cervical Sidebending AROM  - 1-2 x daily - 7 x weekly - 10 reps - 5-10 sec hold  - Seated Cervical Flexion AROM  - 1-2 x daily - 7 x weekly - 10 reps - 5-10 sec hold  -------------------------------------------------------------------------------------------------------------------------  Access Code: [email or web address]  URL: [email or web address]  Date: 04/29/2025  Prepared by: Chinmay Manning    Exercises  - Walking with Full Head Turns  - 1-2 x daily - 7 x weekly - 1-2 minute  - Walking with Head Nod  - 1-2 x daily - 7 x weekly - 1-2 minute  - Gaze Stability (VOR) x1 Feet Together on Firm Ground With Horizontal Head Turns  - 1-2 x daily - 7 x weekly - 1-2 minutes  - Seated Cervical Rotation AROM  - 1-2 x daily - 7 x weekly - 10 reps - 5-10 hold  - Seated Cervical Sidebending AROM  - 1-2 x daily - 7 x weekly - 10 reps - 5-10 sec hold  - Seated Cervical Flexion AROM  - 1-2 x daily - 7 x weekly - 10 reps - 5-10 sec hold  - Turning in Both Directions  - 1-2 x daily - 7 x weekly - 1-5 reps    Goals  - Full return to prior level of function to allow continued working capability.  - Reduce DHI outcome measure goal to less than or equal to 15 for meaningful and clinical improvements in dizziness condition.  - Full resolution of dizziness to improve quality of life.  - Complete resolution of postural dizziness for improved ability to sleep, transfer in bed, bend down and look upwards.  - Patient to have no falls and negative test on condition 7 within ALISIA SOP testing to improve postural control and balance.  - Increase cervical AROM greater than or equal to 60° into flexion, 35° into extension, 30° bilateral into side bending and 55° bilateral into rotation without pain for ease of gazing while driving and neck movement during chores.  - Patient to complete VOR x1 on  stable surface, feet shoulder width apart at 100 bpm for 1 minute without onset of symptoms for improve VOR function and reduced contribution to overall dizziness.    Plan of care developed in agreement with patient.     Personal Factors Affecting Care: Comorbidities  PT Clinical Presentation: Evolving with changing characteristics  Rehab Potential: Good       Screening  Frequency  Date Last Completed   Spiritual and Cultural Beliefs   Screening  each visit or episode of care 4/10/2025   Falls Risk Screening  every ambulatory visit    Pain Screening  annually at primary care visit  1/26/2023   Domestic Violence screening  annually at primary care visit 4/10/2025   Elder Abuse Screening  annually at primary care visit 4/10/2025   Depression Screening  annually in the primary care setting 8/9/2024   Suicide Risk Screening  annually in the primary care setting 4/10/2025   Nutrition and Food Insecurity   Screening  at least annually at primary care visit     Key Learner  annually in the primary care setting 4/10/2025   Drug Screen  2/11/2025  9:10 AM   Alcohol Screen  2/11/2025  9:10 AM   Advance Directive  4/10/2025          Time Calculation  Start Time: 0739  Stop Time: 0819  Time Calculation (min): 40 min     PT Therapeutic Procedures Time Entry  Manual Therapy Time Entry: 30  Neuromuscular Re-Education Time Entry: 9

## 2025-05-13 ENCOUNTER — TREATMENT (OUTPATIENT)
Dept: PHYSICAL THERAPY | Facility: CLINIC | Age: 85
End: 2025-05-13
Payer: MEDICARE

## 2025-05-13 DIAGNOSIS — M54.2 CERVICALGIA: Primary | ICD-10-CM

## 2025-05-13 DIAGNOSIS — M43.6 NECK STIFFNESS: ICD-10-CM

## 2025-05-13 DIAGNOSIS — R42 VERTIGO: ICD-10-CM

## 2025-05-13 PROCEDURE — 97140 MANUAL THERAPY 1/> REGIONS: CPT | Mod: GP | Performed by: PHYSICAL THERAPIST

## 2025-05-13 PROCEDURE — 97112 NEUROMUSCULAR REEDUCATION: CPT | Mod: GP | Performed by: PHYSICAL THERAPIST

## 2025-05-13 NOTE — PROGRESS NOTES
Vestibular Physical Therapy Initial Examination Note    Patient Name: Desiree Cochran  MRN Number: [id number]  Initial Examination Date: 5/13/2025  Referring Clinician: Betsy Schneider MD  Reason for Visit: Vertigo    Insurance  Visit Number: 5   Approved Visits: 5 visits  Certification/ POC Period: 4/21/25 - 6/19/25  Coverage: Payor: ANTH MEDICARE / Plan: [employer] MEDICARE ADVANTAGE / Product Type: *No Product type* /     Precautions/ History  Right side sciatica, some numbness.     Problem List  Problem List Items Addressed This Visit           ICD-10-CM    Vertigo R42    Cervicalgia - Primary M54.2    Neck stiffness M43.6     Subjective  Neck is feeling a little better, she is only on one pillow right now. Dizziness has been about the same over the last week. Dizziness is all the time still. Still feels like things are wavering. Has not discussed if any over her medications are causing. Still feeling like she will almost fall over. Balance has been ok. Does the stairs every time she comes to therapy. Avoids going down the stairs, does use the stairs at therapy. She is still doing a little bit of sewing, dont sit with her head down as long. She is down to one pillow at home, she was using 3 pillows before. She is doing 100 bpm during the gaze stabilization exercises.     Prior level of function: Less dizziness.   Patient Goals: Reduce dizziness.    Inspection  Gait: Improved speed, not as cautious since beginning physical therapy.     Objective  Other Measures  Other Outcome Measures: DHI = 36    Cervical AROM Left Right Center Comments   Rotation 20° 25°     Side Flexion 20° 18°     Flexion   48°    Extension   30°      [place] SOP  condition 1: Normal  condition 2: Normal  condition 3: Normal  condition 4: Normal  condition 5: Normal  condition 6: Normal  condition 7: Left (32° turn)    Treatment  Manual Therapy   - passive neck ROM, side bending, rotation   - soft tissue mobilization, left sub-occipital down to cervical  paraspinals    Neuromuscular Reeducation   - updated objective measures for progress note   - gaze stabilization horizontal 2x1 min, 80 bpm   - gaze stabilization horizontal 1x1 min, 100 bpm     Assessment  Progress note performed this session to assess for insurance re-authorization purposes. Improved neck AROM, restrictions remain, progressing towards goal. ALISIA SOP testing supports improvements in standing balance, suggesting reduction in fall risk. 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 Skilled PT services continue to be necessary to instruct on safe balance exercises, perform compensation exercises for vestibular system and provide manual care.    Problem List: Functional Limitations/ Difficulties, Dizziness Handicap, Dizziness, Limitations in Cervical AROM, and Postural Instability/ Fall Risk    Plan  Can assess passive accessory movement.    Planned interventions include: Patient education, Home exercise program, Therapeutic exercise , Manual therapy, and Neuromuscular re-education    1 visit / Week for 8 weeks    Home Program  Access Code: [email or web address]  URL: [email or web address]  Date: 04/24/2025  Prepared by: Chinmay Manning    Exercises  - Walking with Full Head Turns  - 1-2 x daily - 7 x weekly - 1-2 minute  - Walking with Head Nod  - 1-2 x daily - 7 x weekly - 1-2 minute  - Gaze Stability (VOR) x1 Feet Together on Firm Ground With Horizontal Head Turns  - 1-2 x daily - 7 x weekly - 1-2 minutes  - Seated Cervical Rotation AROM  - 1-2 x daily - 7 x weekly - 10 reps - 5-10 hold  - Seated Cervical Sidebending AROM  - 1-2 x daily - 7 x weekly - 10 reps - 5-10 sec hold  - Seated Cervical Flexion AROM  - 1-2 x daily - 7 x weekly - 10 reps - 5-10 sec hold  -------------------------------------------------------------------------------------------------------------------------  Access Code: [email or web address]  URL:  [email or web address]  Date: 04/29/2025  Prepared by: Chinmay Manning    Exercises  - Walking with Full Head Turns  - 1-2 x daily - 7 x weekly - 1-2 minute  - Walking with Head Nod  - 1-2 x daily - 7 x weekly - 1-2 minute  - Gaze Stability (VOR) x1 Feet Together on Firm Ground With Horizontal Head Turns  - 1-2 x daily - 7 x weekly - 1-2 minutes  - Seated Cervical Rotation AROM  - 1-2 x daily - 7 x weekly - 10 reps - 5-10 hold  - Seated Cervical Sidebending AROM  - 1-2 x daily - 7 x weekly - 10 reps - 5-10 sec hold  - Seated Cervical Flexion AROM  - 1-2 x daily - 7 x weekly - 10 reps - 5-10 sec hold  - Turning in Both Directions  - 1-2 x daily - 7 x weekly - 1-5 reps    Goals  Short Term Goals  - Increase cervical AROM greater than or equal to 60° into flexion, 35° into extension, 30° bilateral into side bending and 55° bilateral into rotation without pain for ease of gazing while driving and neck movement during chores. - 50% complete  - Patient to complete VOR x1 on stable surface, feet shoulder width apart at 100 bpm for 1 minute without onset of symptoms for improve VOR function and reduced contribution to overall dizziness. - 100% complete    Long Term Goals  - Full return to prior level of function to allow continued working capability. - 25 % complete  - Reduce DHI outcome measure goal to less than or equal to 15 for meaningful and clinical improvements in dizziness condition. - not complete  - Full resolution of dizziness to improve quality of life. - not complete  - Patient to have no falls and negative test on condition 7 within ALISIA SOP testing to improve postural control and balance. - 50% complete    Plan of care developed in agreement with patient.     Personal Factors Affecting Care: Comorbidities  PT Clinical Presentation: Evolving with changing characteristics  Rehab Potential: Good       Screening  Frequency  Date Last Completed   Spiritual and Cultural Beliefs   Screening  each  visit or episode of care 4/10/2025   Falls Risk Screening  every ambulatory visit    Pain Screening  annually at primary care visit  1/26/2023   Domestic Violence screening  annually at primary care visit 4/10/2025   Elder Abuse Screening  annually at primary care visit 4/10/2025   Depression Screening  annually in the primary care setting 8/9/2024   Suicide Risk Screening  annually in the primary care setting 4/10/2025   Nutrition and Food Insecurity   Screening  at least annually at primary care visit     Key Learner  annually in the primary care setting 4/10/2025   Drug Screen  2/11/2025  9:10 AM   Alcohol Screen  2/11/2025  9:10 AM   Advance Directive  4/10/2025          Time Calculation  Start Time: 0817  Stop Time: 0859  Time Calculation (min): 42 min     PT Therapeutic Procedures Time Entry  Manual Therapy Time Entry: 13  Neuromuscular Re-Education Time Entry: 26

## 2025-05-15 DIAGNOSIS — I10 PRIMARY HYPERTENSION: ICD-10-CM

## 2025-05-15 DIAGNOSIS — E78.5 DYSLIPIDEMIA: ICD-10-CM

## 2025-05-15 PROBLEM — M43.6 NECK STIFFNESS: Status: ACTIVE | Noted: 2025-05-15

## 2025-05-15 RX ORDER — LOSARTAN POTASSIUM 100 MG/1
100 TABLET ORAL DAILY
Qty: 90 TABLET | Refills: 1 | Status: SHIPPED | OUTPATIENT
Start: 2025-05-15

## 2025-05-15 RX ORDER — SIMVASTATIN 10 MG/1
10 TABLET, FILM COATED ORAL NIGHTLY
Qty: 90 TABLET | Refills: 1 | Status: SHIPPED | OUTPATIENT
Start: 2025-05-15

## 2025-05-20 ENCOUNTER — TREATMENT (OUTPATIENT)
Dept: PHYSICAL THERAPY | Facility: CLINIC | Age: 85
End: 2025-05-20
Payer: MEDICARE

## 2025-05-20 DIAGNOSIS — R42 VERTIGO: ICD-10-CM

## 2025-05-20 PROCEDURE — 97140 MANUAL THERAPY 1/> REGIONS: CPT | Mod: GP | Performed by: PHYSICAL THERAPIST

## 2025-05-20 NOTE — PROGRESS NOTES
Vestibular Physical Therapy Initial Examination Note    Patient Name: Desiree Cochran  MRN Number: [id number]  Initial Examination Date: 5/20/2025  Referring Clinician: Betsy Schneider MD  Reason for Visit: Vertigo    Insurance  Visit Number: 6 (1 of 5)  Approved Visits: 5 visits   Certification/ POC Period: 5/20/25 - 6/18/25  Coverage: Payor: ANTH MEDICARE / Plan: [facility] MEDICARE ADVANTAGE / Product Type: *No Product type* /     Precautions/ History  Right side sciatica, some numbness.     Problem List  Problem List Items Addressed This Visit           ICD-10-CM    Vertigo R42     Subjective  Feeling better, notices a difference.    Treatment  Manual Therapy   - passive neck ROM, side bending, rotation   - soft tissue mobilization, left sub-occipital down to cervical paraspinals    Assessment  Patient late over 20 minutes, shortened treatment. Mobility improving.     Problem List: Functional Limitations/ Difficulties, Dizziness Handicap, Dizziness, Limitations in Cervical AROM, and Postural Instability/ Fall Risk    Plan  Can assess passive accessory movement.    Planned interventions include: Patient education, Home exercise program, Therapeutic exercise , Manual therapy, and Neuromuscular re-education    1 visit / Week for 8 weeks    Home Program  Access Code: [email or web address]  URL: [email or web address]  Date: 04/24/2025  Prepared by: Chinmay Manning    Exercises  - Walking with Full Head Turns  - 1-2 x daily - 7 x weekly - 1-2 minute  - Walking with Head Nod  - 1-2 x daily - 7 x weekly - 1-2 minute  - Gaze Stability (VOR) x1 Feet Together on Firm Ground With Horizontal Head Turns  - 1-2 x daily - 7 x weekly - 1-2 minutes  - Seated Cervical Rotation AROM  - 1-2 x daily - 7 x weekly - 10 reps - 5-10 hold  - Seated Cervical Sidebending AROM  - 1-2 x daily - 7 x weekly - 10 reps - 5-10 sec hold  - Seated Cervical Flexion AROM  - 1-2 x daily - 7 x weekly - 10 reps - 5-10 sec  hold  -------------------------------------------------------------------------------------------------------------------------  Access Code: [email or web address]  URL: [email or web address]  Date: 04/29/2025  Prepared by: Chinmay Manning    Exercises  - Walking with Full Head Turns  - 1-2 x daily - 7 x weekly - 1-2 minute  - Walking with Head Nod  - 1-2 x daily - 7 x weekly - 1-2 minute  - Gaze Stability (VOR) x1 Feet Together on Firm Ground With Horizontal Head Turns  - 1-2 x daily - 7 x weekly - 1-2 minutes  - Seated Cervical Rotation AROM  - 1-2 x daily - 7 x weekly - 10 reps - 5-10 hold  - Seated Cervical Sidebending AROM  - 1-2 x daily - 7 x weekly - 10 reps - 5-10 sec hold  - Seated Cervical Flexion AROM  - 1-2 x daily - 7 x weekly - 10 reps - 5-10 sec hold  - Turning in Both Directions  - 1-2 x daily - 7 x weekly - 1-5 reps    Goals  Short Term Goals  - Increase cervical AROM greater than or equal to 60° into flexion, 35° into extension, 30° bilateral into side bending and 55° bilateral into rotation without pain for ease of gazing while driving and neck movement during chores. - 50% complete  - Patient to complete VOR x1 on stable surface, feet shoulder width apart at 100 bpm for 1 minute without onset of symptoms for improve VOR function and reduced contribution to overall dizziness. - 100% complete    Long Term Goals  - Full return to prior level of function to allow continued working capability. - 25 % complete  - Reduce DHI outcome measure goal to less than or equal to 15 for meaningful and clinical improvements in dizziness condition. - not complete  - Full resolution of dizziness to improve quality of life. - not complete  - Patient to have no falls and negative test on condition 7 within ALISIA SOP testing to improve postural control and balance. - 50% complete    Plan of care developed in agreement with patient.     Personal Factors Affecting Care: Comorbidities  PT Clinical Presentation:  Evolving with changing characteristics  Rehab Potential: Good       Screening  Frequency  Date Last Completed   Spiritual and Cultural Beliefs   Screening  each visit or episode of care 4/10/2025   Falls Risk Screening  every ambulatory visit    Pain Screening  annually at primary care visit  1/26/2023   Domestic Violence screening  annually at primary care visit 4/10/2025   Elder Abuse Screening  annually at primary care visit 4/10/2025   Depression Screening  annually in the primary care setting 8/9/2024   Suicide Risk Screening  annually in the primary care setting 4/10/2025   Nutrition and Food Insecurity   Screening  at least annually at primary care visit     Key Learner  annually in the primary care setting 4/10/2025   Drug Screen  2/11/2025  9:10 AM   Alcohol Screen  2/11/2025  9:10 AM   Advance Directive  4/10/2025          Time Calculation  Start Time: 0837  Stop Time: 0900  Time Calculation (min): 23 min     PT Therapeutic Procedures Time Entry  Manual Therapy Time Entry: 23

## 2025-05-23 ENCOUNTER — HOSPITAL ENCOUNTER (OUTPATIENT)
Dept: RADIOLOGY | Facility: CLINIC | Age: 85
Discharge: HOME | End: 2025-05-23
Payer: MEDICARE

## 2025-05-23 ENCOUNTER — APPOINTMENT (OUTPATIENT)
Dept: PRIMARY CARE | Facility: CLINIC | Age: 85
End: 2025-05-23
Payer: MEDICARE

## 2025-05-23 VITALS
DIASTOLIC BLOOD PRESSURE: 76 MMHG | WEIGHT: 106.4 LBS | SYSTOLIC BLOOD PRESSURE: 152 MMHG | BODY MASS INDEX: 23.02 KG/M2 | OXYGEN SATURATION: 98 % | HEART RATE: 86 BPM

## 2025-05-23 DIAGNOSIS — I10 PRIMARY HYPERTENSION: ICD-10-CM

## 2025-05-23 DIAGNOSIS — H81.12 BENIGN PAROXYSMAL POSITIONAL VERTIGO OF LEFT EAR: ICD-10-CM

## 2025-05-23 DIAGNOSIS — M43.6 NECK STIFFNESS: ICD-10-CM

## 2025-05-23 DIAGNOSIS — M54.2 CERVICALGIA: ICD-10-CM

## 2025-05-23 DIAGNOSIS — R93.1 ELEVATED CORONARY ARTERY CALCIUM SCORE: ICD-10-CM

## 2025-05-23 DIAGNOSIS — R42 VERTIGO: Primary | ICD-10-CM

## 2025-05-23 PROCEDURE — 72050 X-RAY EXAM NECK SPINE 4/5VWS: CPT

## 2025-05-23 PROCEDURE — 3078F DIAST BP <80 MM HG: CPT | Performed by: INTERNAL MEDICINE

## 2025-05-23 PROCEDURE — 1036F TOBACCO NON-USER: CPT | Performed by: INTERNAL MEDICINE

## 2025-05-23 PROCEDURE — 99214 OFFICE O/P EST MOD 30 MIN: CPT | Performed by: INTERNAL MEDICINE

## 2025-05-23 PROCEDURE — 3077F SYST BP >= 140 MM HG: CPT | Performed by: INTERNAL MEDICINE

## 2025-05-23 PROCEDURE — 1159F MED LIST DOCD IN RCRD: CPT | Performed by: INTERNAL MEDICINE

## 2025-05-23 PROCEDURE — 72050 X-RAY EXAM NECK SPINE 4/5VWS: CPT | Performed by: RADIOLOGY

## 2025-05-23 ASSESSMENT — ENCOUNTER SYMPTOMS
ACTIVITY CHANGE: 0
MYALGIAS: 1
DIZZINESS: 0
CHEST TIGHTNESS: 0
ARTHRALGIAS: 1
HEADACHES: 0
FATIGUE: 0
SHORTNESS OF BREATH: 0
NECK STIFFNESS: 1
PALPITATIONS: 0
LIGHT-HEADEDNESS: 1
NUMBNESS: 0
WEAKNESS: 0
NECK PAIN: 1

## 2025-05-23 NOTE — PROGRESS NOTES
Subjective   Patient ID: Desiree Cochran is a 84 y.o. female who presents for Balance Problem.    Ms. Cochran is in today with persistent balance issues.  She has been working with vestibular therapy closely and much of this is felt to be cervicogenic.  However, she wanted to ensure that this was not blood pressure related.  Blood pressure was elevated here but at home this always runs quite excellent with systolics ranging from the 102-120 range.  She describes is not as a true dizziness, but more of a sensation of being off balance.  If she bends over quickly she feels a bit off balance.  If she turns her head quickly similar symptoms.  Vestibular therapy has not noticed any nystagmus per her description.  She has not had any falls.  She denies any palpitations, headaches.  She wanted to come in to see if lowering blood pressure medicine would be of benefit to try.  Blood work recently checked looked reassuring.         Review of Systems   Constitutional:  Negative for activity change and fatigue.   Respiratory:  Negative for chest tightness and shortness of breath.    Cardiovascular:  Negative for chest pain and palpitations.   Musculoskeletal:  Positive for arthralgias, gait problem, myalgias, neck pain and neck stiffness.   Neurological:  Positive for light-headedness. Negative for dizziness, weakness, numbness and headaches.       Objective   /76   Pulse 86   Wt 48.3 kg (106 lb 6.4 oz)   SpO2 98%   BMI 23.02 kg/m²     Physical Exam  Constitutional:       Appearance: Normal appearance.   Cardiovascular:      Rate and Rhythm: Normal rate and regular rhythm.      Pulses: Normal pulses.      Heart sounds: Normal heart sounds. No murmur heard.     No gallop.   Pulmonary:      Effort: Pulmonary effort is normal. No respiratory distress.   Musculoskeletal:      Right lower leg: No edema.      Left lower leg: No edema.   Neurological:      General: No focal deficit present.      Mental Status: She is alert and  oriented to person, place, and time.      Motor: No weakness.      Gait: Gait (Slow gait, no drifting) normal.         Assessment/Plan   Vertigo with cervicogenic balance disturbance: Gait moving slowly but no drifting.  Reasonable to proceed with repeat C-spine x-rays to see if any sclerosis advanced, could consider cervical CT if progressive.  Reasonable to proceed with head CT as well.  Contact us if any acute worsening.  Elevated coronary calcium score: This was mild and she continues on blood pressure lowering therapy and statin, but reasonable to proceed with carotid ultrasound to see if any connection with her vertigo.  Hypertension: Has been elevated in office but excellent at home.  Reasonable to cut amlodipine in half to see if this affects symptoms (have discussed decreasing losartan but she cannot cut her pill in half).  Contact us if blood pressure starts elevating or if no change in symptoms.    Keep us updated and we will follow-up on imaging studies once available.  Contact us if any worsening or any new questions.

## 2025-05-23 NOTE — PATIENT INSTRUCTIONS
We will start with repeat x-rays of the neck and also consider a CT scan of the head and ultrasound of the neck for further evaluation.  Please keep working with physical therapy.  If you have any questions or worsening symptoms, please contact us.

## 2025-05-27 ENCOUNTER — TELEPHONE (OUTPATIENT)
Dept: PRIMARY CARE | Facility: CLINIC | Age: 85
End: 2025-05-27

## 2025-05-27 ENCOUNTER — TREATMENT (OUTPATIENT)
Dept: PHYSICAL THERAPY | Facility: CLINIC | Age: 85
End: 2025-05-27
Payer: MEDICARE

## 2025-05-27 DIAGNOSIS — I10 PRIMARY HYPERTENSION: ICD-10-CM

## 2025-05-27 DIAGNOSIS — R42 VERTIGO: ICD-10-CM

## 2025-05-27 PROCEDURE — 97140 MANUAL THERAPY 1/> REGIONS: CPT | Mod: GP | Performed by: PHYSICAL THERAPIST

## 2025-05-27 PROCEDURE — 97112 NEUROMUSCULAR REEDUCATION: CPT | Mod: GP | Performed by: PHYSICAL THERAPIST

## 2025-05-27 RX ORDER — AMLODIPINE BESYLATE 5 MG/1
5 TABLET ORAL DAILY
Qty: 90 TABLET | Refills: 2 | Status: SHIPPED | OUTPATIENT
Start: 2025-05-27

## 2025-05-27 NOTE — PROGRESS NOTES
Vestibular Physical Therapy Initial Examination Note    Patient Name: Desiree Cochran  MRN Number: [id number]  Initial Examination Date: 5/27/2025  Referring Clinician: Betsy Schneider MD  Reason for Visit: Vertigo    Insurance  Visit Number: 7 (2 of 5)  Approved Visits: 5 visits   Certification/ POC Period: 5/20/25 - 6/18/25  Coverage: Payor: ANTH MEDICARE / Plan: [employer] MEDICARE ADVANTAGE / Product Type: *No Product type* /     Precautions/ History  Right side sciatica, some numbness.     Problem List  Problem List Items Addressed This Visit           ICD-10-CM    Vertigo R42     Subjective  Had x-rays of neck, arthritis did not change much. She will have a CT of the neck as well. Still getting a feeling of the left ear feeling plugged, when this happens she does have more unsteadiness. Still working on marching with eyes closed at home, turning to the left still. Turning in place with eyes closed at home.     Treatment  Manual Therapy   - passive neck ROM, side bending, rotation   - left C1 CPA   - soft tissue mobilization, left sub-occipital down to cervical paraspinals    Neuromuscular Reeducation   - gaze stabilization 100 bpm x1 min   - gaze stabilization 110 bpm x1 min   - gaze stabilization 120 bpm x1 min   - feet together, eyes closed x1 min   - modified tandem, eyes closed    Assessment  Neck mobility improving. Improved vestibular function, evident by ability to increase to 120 bpm during gaze stabilization.    Problem List: Functional Limitations/ Difficulties, Dizziness Handicap, Dizziness, Limitations in Cervical AROM, and Postural Instability/ Fall Risk    Plan  Can assess passive accessory movement.    Planned interventions include: Patient education, Home exercise program, Therapeutic exercise , Manual therapy, and Neuromuscular re-education    1 visit / Week for 8 weeks    Home Program  Access Code: [email or web address]  URL: [email or web address]  Date: 04/24/2025  Prepared by: Chinmay  Nigel    Exercises  - Walking with Full Head Turns  - 1-2 x daily - 7 x weekly - 1-2 minute  - Walking with Head Nod  - 1-2 x daily - 7 x weekly - 1-2 minute  - Gaze Stability (VOR) x1 Feet Together on Firm Ground With Horizontal Head Turns  - 1-2 x daily - 7 x weekly - 1-2 minutes  - Seated Cervical Rotation AROM  - 1-2 x daily - 7 x weekly - 10 reps - 5-10 hold  - Seated Cervical Sidebending AROM  - 1-2 x daily - 7 x weekly - 10 reps - 5-10 sec hold  - Seated Cervical Flexion AROM  - 1-2 x daily - 7 x weekly - 10 reps - 5-10 sec hold  -------------------------------------------------------------------------------------------------------------------------  Access Code: [email or web address]  URL: [email or web address]  Date: 04/29/2025  Prepared by: Chinmay Manning    Exercises  - Walking with Full Head Turns  - 1-2 x daily - 7 x weekly - 1-2 minute  - Walking with Head Nod  - 1-2 x daily - 7 x weekly - 1-2 minute  - Gaze Stability (VOR) x1 Feet Together on Firm Ground With Horizontal Head Turns  - 1-2 x daily - 7 x weekly - 1-2 minutes  - Seated Cervical Rotation AROM  - 1-2 x daily - 7 x weekly - 10 reps - 5-10 hold  - Seated Cervical Sidebending AROM  - 1-2 x daily - 7 x weekly - 10 reps - 5-10 sec hold  - Seated Cervical Flexion AROM  - 1-2 x daily - 7 x weekly - 10 reps - 5-10 sec hold  - Turning in Both Directions  - 1-2 x daily - 7 x weekly - 1-5 reps    Goals  Short Term Goals  - Increase cervical AROM greater than or equal to 60° into flexion, 35° into extension, 30° bilateral into side bending and 55° bilateral into rotation without pain for ease of gazing while driving and neck movement during chores. - 50% complete  - Patient to complete VOR x1 on stable surface, feet shoulder width apart at 100 bpm for 1 minute without onset of symptoms for improve VOR function and reduced contribution to overall dizziness. - 100% complete    Long Term Goals  - Full return to prior level of function to allow  continued working capability. - 25 % complete  - Reduce DHI outcome measure goal to less than or equal to 15 for meaningful and clinical improvements in dizziness condition. - not complete  - Full resolution of dizziness to improve quality of life. - not complete  - Patient to have no falls and negative test on condition 7 within ALISIA SOP testing to improve postural control and balance. - 50% complete    Plan of care developed in agreement with patient.     Personal Factors Affecting Care: Comorbidities  PT Clinical Presentation: Evolving with changing characteristics  Rehab Potential: Good       Screening  Frequency  Date Last Completed   Spiritual and Cultural Beliefs   Screening  each visit or episode of care 4/10/2025   Falls Risk Screening  every ambulatory visit    Pain Screening  annually at primary care visit  1/26/2023   Domestic Violence screening  annually at primary care visit 4/10/2025   Elder Abuse Screening  annually at primary care visit 4/10/2025   Depression Screening  annually in the primary care setting 8/9/2024   Suicide Risk Screening  annually in the primary care setting 4/10/2025   Nutrition and Food Insecurity   Screening  at least annually at primary care visit     Key Learner  annually in the primary care setting 4/10/2025   Drug Screen  2/11/2025  9:10 AM   Alcohol Screen  2/11/2025  9:10 AM   Advance Directive  4/10/2025          Time Calculation  Start Time: 0815  Stop Time: 0902  Time Calculation (min): 47 min     PT Therapeutic Procedures Time Entry  Manual Therapy Time Entry: 25  Neuromuscular Re-Education Time Entry: 15

## 2025-06-05 ENCOUNTER — APPOINTMENT (OUTPATIENT)
Dept: RADIOLOGY | Facility: CLINIC | Age: 85
End: 2025-06-05
Payer: MEDICARE

## 2025-06-05 ENCOUNTER — ANCILLARY PROCEDURE (OUTPATIENT)
Dept: VASCULAR MEDICINE | Facility: CLINIC | Age: 85
End: 2025-06-05
Payer: MEDICARE

## 2025-06-05 DIAGNOSIS — I10 PRIMARY HYPERTENSION: ICD-10-CM

## 2025-06-05 DIAGNOSIS — I65.23 OCCLUSION AND STENOSIS OF BILATERAL CAROTID ARTERIES: ICD-10-CM

## 2025-06-05 DIAGNOSIS — R42 VERTIGO: ICD-10-CM

## 2025-06-05 DIAGNOSIS — R93.1 ELEVATED CORONARY ARTERY CALCIUM SCORE: ICD-10-CM

## 2025-06-05 PROCEDURE — 93880 EXTRACRANIAL BILAT STUDY: CPT

## 2025-06-05 PROCEDURE — 70450 CT HEAD/BRAIN W/O DYE: CPT

## 2025-06-05 PROCEDURE — 70450 CT HEAD/BRAIN W/O DYE: CPT | Performed by: RADIOLOGY

## 2025-06-05 PROCEDURE — 93880 EXTRACRANIAL BILAT STUDY: CPT | Performed by: SURGERY

## 2025-06-10 ENCOUNTER — APPOINTMENT (OUTPATIENT)
Dept: PRIMARY CARE | Facility: CLINIC | Age: 85
End: 2025-06-10
Payer: MEDICARE

## 2025-06-13 ENCOUNTER — TREATMENT (OUTPATIENT)
Dept: PHYSICAL THERAPY | Facility: CLINIC | Age: 85
End: 2025-06-13
Payer: MEDICARE

## 2025-06-13 ENCOUNTER — APPOINTMENT (OUTPATIENT)
Dept: RADIOLOGY | Facility: HOSPITAL | Age: 85
End: 2025-06-13
Payer: MEDICARE

## 2025-06-13 DIAGNOSIS — R42 VERTIGO: ICD-10-CM

## 2025-06-13 PROCEDURE — 97140 MANUAL THERAPY 1/> REGIONS: CPT | Mod: GP | Performed by: PHYSICAL THERAPIST

## 2025-06-17 ENCOUNTER — TREATMENT (OUTPATIENT)
Dept: PHYSICAL THERAPY | Facility: CLINIC | Age: 85
End: 2025-06-17
Payer: MEDICARE

## 2025-06-17 DIAGNOSIS — R42 VERTIGO: ICD-10-CM

## 2025-06-17 DIAGNOSIS — R26.81 UNSTEADINESS: Primary | ICD-10-CM

## 2025-06-17 DIAGNOSIS — M43.6 NECK STIFFNESS: ICD-10-CM

## 2025-06-17 PROCEDURE — 97112 NEUROMUSCULAR REEDUCATION: CPT | Mod: GP | Performed by: PHYSICAL THERAPIST

## 2025-06-17 PROCEDURE — 97110 THERAPEUTIC EXERCISES: CPT | Mod: GP | Performed by: PHYSICAL THERAPIST

## 2025-06-17 NOTE — PROGRESS NOTES
Vestibular Physical Therapy Initial Examination Note    Patient Name: Desiree Cochran  MRN Number: [id number]  Initial Examination Date: 2025  Referring Clinician: Betsy Schneider MD  Reason for Visit: Vertigo    Insurance  Visit Number: 9 (3 of 5)  Approved Visits: 5 visits   Certification/ POC Period: 25 - 25  Coverage: Payor: ANTH MEDICARE / Plan: [email or web address] Ã¨â€¹Â±Ã¨â€¦Â¾Ã§Â½â€˜ MEDICARE ADVANTAGE / Product Type: *No Product type* /     Precautions/ History  Right side sciatica, some numbness.     Problem List  Problem List Items Addressed This Visit           ICD-10-CM    Vertigo R42    Neck stiffness M43.6    Unsteadiness - Primary R26.81     Subjective  Neck feels much better. Keeping warm compress on the neck, moist heat, using that 3 times a day. Still has dizziness and balance problem. Has been doing the exercises at home. Turning head fast is challenging, causes some neck pain and unsteadiness. She denies spinning. Just feels unstable, like on a boat. Some days balance is better since starting PT, other days not so much. Pressure in the ears on cloudy days. Neck doing better, still down to one pillow.    Prior level of function: Less dizziness.   Patient Goals: Reduce dizziness.    Inspection  Gait: Improved speed, not as cautious since beginning physical therapy.  Timed up and Go: 14.71 pounds  MUSE Balance Testin    Objective  Other Measures  Other Outcome Measures: DHI = 36    Cervical AROM Left Right Center Comments   Rotation 25° 25°     Side Flexion 12° 18°     Flexion   48°    Extension   30°      [place] SOP  condition 1: Normal  condition 2: Normal  condition 3: Normal  condition 4: Normal  condition 5: Normal  condition 6: Normal  condition 7: Normal    Treatment  Manual Therapy   - passive neck ROM, side bending, rotation   - left C1 CPA   - soft tissue mobilization, left sub-occipital down to cervical paraspinals    Neuromuscular Reeducation   - TUG testing   - balance testing   - updated objective measures  for progress note/ discharge    Assessment  Good improvements in mobility, standing balance, evident by all functional balance testing. Home exercise program updated for continued independent exercise. [facility] to [facility].    Problem List: Functional Limitations/ Difficulties, Dizziness Handicap, Dizziness, Limitations in Cervical AROM, and Postural Instability/ Fall Risk    Plan      Planned interventions include: Patient education, Home exercise program, Therapeutic exercise , Manual therapy, and Neuromuscular re-education    1 visit / Week for 8 weeks    Home Program  Access Code: [email or web address]  URL: [email or web address]  Date: 04/24/2025  Prepared by: Chinmay Manning    Exercises  - Walking with Full Head Turns  - 1-2 x daily - 7 x weekly - 1-2 minute  - Walking with Head Nod  - 1-2 x daily - 7 x weekly - 1-2 minute  - Gaze Stability (VOR) x1 Feet Together on Firm Ground With Horizontal Head Turns  - 1-2 x daily - 7 x weekly - 1-2 minutes  - Seated Cervical Rotation AROM  - 1-2 x daily - 7 x weekly - 10 reps - 5-10 hold  - Seated Cervical Sidebending AROM  - 1-2 x daily - 7 x weekly - 10 reps - 5-10 sec hold  - Seated Cervical Flexion AROM  - 1-2 x daily - 7 x weekly - 10 reps - 5-10 sec hold  -------------------------------------------------------------------------------------------------------------------------  Access Code: [email or web address]  URL: [email or web address]  Date: 04/29/2025  Prepared by: Chinmay Manning    Exercises  - Walking with Full Head Turns  - 1-2 x daily - 7 x weekly - 1-2 minute  - Walking with Head Nod  - 1-2 x daily - 7 x weekly - 1-2 minute  - Gaze Stability (VOR) x1 Feet Together on Firm Ground With Horizontal Head Turns  - 1-2 x daily - 7 x weekly - 1-2 minutes  - Seated Cervical Rotation AROM  - 1-2 x daily - 7 x weekly - 10 reps - 5-10 hold  - Seated Cervical Sidebending AROM  - 1-2 x daily - 7 x weekly - 10 reps - 5-10 sec hold  - Seated Cervical Flexion  AROM  - 1-2 x daily - 7 x weekly - 10 reps - 5-10 sec hold  - Turning in Both Directions  - 1-2 x daily - 7 x weekly - 1-5 reps  -------------------------------------------------------------------------------------------------------------------------  Access Code: [email or web address]  URL: [email or web address]  Date: 06/17/2025  Prepared by: Chinmay Manning    Exercises  - Walking with Full Head Turns  - 1-2 x daily - 7 x weekly - 1-2 minute  - Walking with Head Nod  - 1-2 x daily - 7 x weekly - 1-2 minute  - Gaze Stability (VOR) x1 Feet Together on Firm Ground With Horizontal Head Turns  - 1-2 x daily - 7 x weekly - 1-2 minutes  - Seated Cervical Rotation AROM  - 1-2 x daily - 7 x weekly - 10 reps - 5-10 hold  - Seated Cervical Sidebending AROM  - 1-2 x daily - 7 x weekly - 10 reps - 5-10 sec hold  - Seated Cervical Flexion AROM  - 1-2 x daily - 7 x weekly - 10 reps - 5-10 sec hold  - Turning in Both Directions  - 1-2 x daily - 7 x weekly - 1-5 reps  -------------------------------------------------------------------------------------------------------------------------  Access Code: [email or web address]  URL: [email or web address]  Date: 06/17/2025  Prepared by: Chinmay Manning    Exercises  - Toe Walking with Counter Support  - 3 x weekly - 1-2 minutes  - Tandem Walking with Counter Support  - 3 x weekly - 1-2 minutes  - Walking with Full Head Turns  - 1-2 x daily - 3 x weekly - 1-2 minute  - Walking with Head Nod  - 1-2 x daily - 3 x weekly - 1-2 minute  - Gaze Stability (VOR) x1 Feet Together on Firm Ground With Horizontal Head Turns  - 1-2 x daily - 3 x weekly - 1-2 minutes  - Turning in Both Directions  - 1-2 x daily - 3 x weekly - 1-5 reps  - Cervical Rotation AROM with Overpressure  - 1-2 x daily - 7 x weekly - 3 reps - 20 sec hold  - Seated Cervical Sidebending Stretch  - 1-2 x daily - 7 x weekly - 3 reps - 20 sec hold  - Seated Cervical Flexion Stretch with Finger Support Behind Neck   - 1-2 x daily - 7 x weekly - 3 reps - 20 sec hold    Goals  Short Term Goals  - Increase cervical AROM greater than or equal to 60° into flexion, 35° into extension, 30° bilateral into side bending and 55° bilateral into rotation without pain for ease of gazing while driving and neck movement during chores. - 60% complete  - Patient to complete VOR x1 on stable surface, feet shoulder width apart at 100 bpm for 1 minute without onset of symptoms for improve VOR function and reduced contribution to overall dizziness. - 100% complete    Long Term Goals  - Full return to prior level of function to allow continued working capability. - 75% complete  - Reduce DHI outcome measure goal to less than or equal to 15 for meaningful and clinical improvements in dizziness condition. - not complete  - Full resolution of dizziness to improve quality of life. - not complete  - Patient to have no falls and negative test on condition 7 within ALISIA SOP testing to improve postural control and balance. - 100% complete    Plan of care developed in agreement with patient.     Personal Factors Affecting Care: Comorbidities  PT Clinical Presentation: Evolving with changing characteristics  Rehab Potential: Good       Screening  Frequency  Date Last Completed   Spiritual and Cultural Beliefs   Screening  each visit or episode of care 4/10/2025   Falls Risk Screening  every ambulatory visit    Pain Screening  annually at primary care visit  1/26/2023   Domestic Violence screening  annually at primary care visit 4/10/2025   Elder Abuse Screening  annually at primary care visit 4/10/2025   Depression Screening  annually in the primary care setting 8/9/2024   Suicide Risk Screening  annually in the primary care setting 4/10/2025   Nutrition and Food Insecurity   Screening  at least annually at primary care visit     Key Learner  annually in the primary care setting 4/10/2025   Drug Screen  2/11/2025  9:10 AM   Alcohol Screen  2/11/2025  9:10 AM    Advance Directive  4/10/2025          Time Calculation  Start Time: 1032  Stop Time: 1114  Time Calculation (min): 42 min     PT Therapeutic Procedures Time Entry  Therapeutic Exercise Time Entry: 19  Neuromuscular Re-Education Time Entry: 21

## 2025-06-18 NOTE — PROGRESS NOTES
Vestibular Physical Therapy Initial Examination Note    Patient Name: Desiree Cochran  MRN Number: [id number]  Initial Examination Date: 6/13/2025  Referring Clinician: Betsy Schneider MD  Reason for Visit: Vertigo    Insurance  Visit Number: 8 (2 of 5)  Approved Visits: 5 visits   Certification/ POC Period: 5/20/25 - 6/18/25  Coverage: Payor: [employer] MEDICARE / Plan: [employer] MEDICARE ADVANTAGE / Product Type: *No Product type* /     Precautions/ History  Right side sciatica, some numbness.     Problem List  Problem List Items Addressed This Visit           ICD-10-CM    Vertigo R42     Subjective  Doing pretty good, missed her turn today, this was the first time that she drove herself to PT.    Treatment  Manual Therapy   - passive neck ROM, side bending, rotation   - left C1 CPA in supine    Assessment  No progress this session, she arrives 15 minutes late to appointment.    Problem List: Functional Limitations/ Difficulties, Dizziness Handicap, Dizziness, Limitations in Cervical AROM, and Postural Instability/ Fall Risk    Plan  Can assess passive accessory movement.    Planned interventions include: Patient education, Home exercise program, Therapeutic exercise , Manual therapy, and Neuromuscular re-education    1 visit / Week for 8 weeks    Home Program  Access Code: [email or web address]  URL: [email or web address]  Date: 04/24/2025  Prepared by: Chinmay Manning    Exercises  - Walking with Full Head Turns  - 1-2 x daily - 7 x weekly - 1-2 minute  - Walking with Head Nod  - 1-2 x daily - 7 x weekly - 1-2 minute  - Gaze Stability (VOR) x1 Feet Together on Firm Ground With Horizontal Head Turns  - 1-2 x daily - 7 x weekly - 1-2 minutes  - Seated Cervical Rotation AROM  - 1-2 x daily - 7 x weekly - 10 reps - 5-10 hold  - Seated Cervical Sidebending AROM  - 1-2 x daily - 7 x weekly - 10 reps - 5-10 sec hold  - Seated Cervical Flexion AROM  - 1-2 x daily - 7 x weekly - 10 reps - 5-10 sec  hold  -------------------------------------------------------------------------------------------------------------------------  Access Code: [email or web address]  URL: [email or web address]  Date: 04/29/2025  Prepared by: Chinmay Manning    Exercises  - Walking with Full Head Turns  - 1-2 x daily - 7 x weekly - 1-2 minute  - Walking with Head Nod  - 1-2 x daily - 7 x weekly - 1-2 minute  - Gaze Stability (VOR) x1 Feet Together on Firm Ground With Horizontal Head Turns  - 1-2 x daily - 7 x weekly - 1-2 minutes  - Seated Cervical Rotation AROM  - 1-2 x daily - 7 x weekly - 10 reps - 5-10 hold  - Seated Cervical Sidebending AROM  - 1-2 x daily - 7 x weekly - 10 reps - 5-10 sec hold  - Seated Cervical Flexion AROM  - 1-2 x daily - 7 x weekly - 10 reps - 5-10 sec hold  - Turning in Both Directions  - 1-2 x daily - 7 x weekly - 1-5 reps    Goals  Short Term Goals  - Increase cervical AROM greater than or equal to 60° into flexion, 35° into extension, 30° bilateral into side bending and 55° bilateral into rotation without pain for ease of gazing while driving and neck movement during chores. - 50% complete  - Patient to complete VOR x1 on stable surface, feet shoulder width apart at 100 bpm for 1 minute without onset of symptoms for improve VOR function and reduced contribution to overall dizziness. - 100% complete    Long Term Goals  - Full return to prior level of function to allow continued working capability. - 25 % complete  - Reduce DHI outcome measure goal to less than or equal to 15 for meaningful and clinical improvements in dizziness condition. - not complete  - Full resolution of dizziness to improve quality of life. - not complete  - Patient to have no falls and negative test on condition 7 within ALISIA SOP testing to improve postural control and balance. - 50% complete    Plan of care developed in agreement with patient.     Personal Factors Affecting Care: Comorbidities  PT Clinical Presentation:  Evolving with changing characteristics  Rehab Potential: Good       Screening  Frequency  Date Last Completed   Spiritual and Cultural Beliefs   Screening  each visit or episode of care 4/10/2025   Falls Risk Screening  every ambulatory visit    Pain Screening  annually at primary care visit  1/26/2023   Domestic Violence screening  annually at primary care visit 4/10/2025   Elder Abuse Screening  annually at primary care visit 4/10/2025   Depression Screening  annually in the primary care setting 8/9/2024   Suicide Risk Screening  annually in the primary care setting 4/10/2025   Nutrition and Food Insecurity   Screening  at least annually at primary care visit     Key Learner  annually in the primary care setting 4/10/2025   Drug Screen  2/11/2025  9:10 AM   Alcohol Screen  2/11/2025  9:10 AM   Advance Directive  4/10/2025          Time Calculation  Start Time: 0329  Stop Time: 0345  Time Calculation (min): 16 min     PT Therapeutic Procedures Time Entry  Manual Therapy Time Entry: 15

## 2025-08-13 ENCOUNTER — APPOINTMENT (OUTPATIENT)
Dept: PRIMARY CARE | Facility: CLINIC | Age: 85
End: 2025-08-13
Payer: MEDICARE

## 2025-08-13 VITALS
DIASTOLIC BLOOD PRESSURE: 73 MMHG | WEIGHT: 104 LBS | HEIGHT: 57 IN | SYSTOLIC BLOOD PRESSURE: 132 MMHG | BODY MASS INDEX: 22.44 KG/M2 | OXYGEN SATURATION: 98 % | HEART RATE: 77 BPM

## 2025-08-13 DIAGNOSIS — E78.5 DYSLIPIDEMIA: ICD-10-CM

## 2025-08-13 DIAGNOSIS — R42 VERTIGO: ICD-10-CM

## 2025-08-13 DIAGNOSIS — D72.819 LEUKOPENIA, UNSPECIFIED TYPE: ICD-10-CM

## 2025-08-13 DIAGNOSIS — Z12.31 ENCOUNTER FOR SCREENING MAMMOGRAM FOR MALIGNANT NEOPLASM OF BREAST: ICD-10-CM

## 2025-08-13 DIAGNOSIS — M85.80 OSTEOPENIA, UNSPECIFIED LOCATION: ICD-10-CM

## 2025-08-13 DIAGNOSIS — I10 PRIMARY HYPERTENSION: ICD-10-CM

## 2025-08-13 DIAGNOSIS — R93.1 ELEVATED CORONARY ARTERY CALCIUM SCORE: ICD-10-CM

## 2025-08-13 DIAGNOSIS — Z00.00 ROUTINE GENERAL MEDICAL EXAMINATION AT HEALTH CARE FACILITY: Primary | ICD-10-CM

## 2025-08-13 PROCEDURE — 3078F DIAST BP <80 MM HG: CPT | Performed by: INTERNAL MEDICINE

## 2025-08-13 PROCEDURE — 1159F MED LIST DOCD IN RCRD: CPT | Performed by: INTERNAL MEDICINE

## 2025-08-13 PROCEDURE — 3075F SYST BP GE 130 - 139MM HG: CPT | Performed by: INTERNAL MEDICINE

## 2025-08-13 PROCEDURE — 1036F TOBACCO NON-USER: CPT | Performed by: INTERNAL MEDICINE

## 2025-08-13 PROCEDURE — G0439 PPPS, SUBSEQ VISIT: HCPCS | Performed by: INTERNAL MEDICINE

## 2025-08-13 PROCEDURE — 1170F FXNL STATUS ASSESSED: CPT | Performed by: INTERNAL MEDICINE

## 2025-08-13 PROCEDURE — 99397 PER PM REEVAL EST PAT 65+ YR: CPT | Performed by: INTERNAL MEDICINE

## 2025-08-13 PROCEDURE — 1160F RVW MEDS BY RX/DR IN RCRD: CPT | Performed by: INTERNAL MEDICINE

## 2025-08-13 PROCEDURE — 99214 OFFICE O/P EST MOD 30 MIN: CPT | Performed by: INTERNAL MEDICINE

## 2025-08-13 ASSESSMENT — ACTIVITIES OF DAILY LIVING (ADL)
DRESSING: INDEPENDENT
GROCERY_SHOPPING: INDEPENDENT
TAKING_MEDICATION: INDEPENDENT
BATHING: INDEPENDENT
MANAGING_FINANCES: INDEPENDENT
DOING_HOUSEWORK: INDEPENDENT

## 2025-08-13 ASSESSMENT — ENCOUNTER SYMPTOMS
DIZZINESS: 1
DIARRHEA: 0
WEAKNESS: 0
EYE ITCHING: 0
EYE DISCHARGE: 0
DYSPHORIC MOOD: 0
HEADACHES: 0
NAUSEA: 0
BRUISES/BLEEDS EASILY: 0
SINUS PAIN: 0
LIGHT-HEADEDNESS: 1
SHORTNESS OF BREATH: 0
SLEEP DISTURBANCE: 0
NERVOUS/ANXIOUS: 0
DECREASED CONCENTRATION: 0
FREQUENCY: 0
PALPITATIONS: 0
WHEEZING: 0
CHILLS: 0
HYPERACTIVE: 0
ACTIVITY CHANGE: 0
FATIGUE: 0
SORE THROAT: 0
NECK PAIN: 0
DEPRESSION: 0
CONSTIPATION: 0
NECK STIFFNESS: 0
HEMATURIA: 0
CHEST TIGHTNESS: 0
ARTHRALGIAS: 0
OCCASIONAL FEELINGS OF UNSTEADINESS: 1
MYALGIAS: 0
DIFFICULTY URINATING: 0
BACK PAIN: 0
ADENOPATHY: 0
APPETITE CHANGE: 0
COLOR CHANGE: 0
COUGH: 0
DYSURIA: 0
VOICE CHANGE: 0
LOSS OF SENSATION IN FEET: 0
SEIZURES: 0
VOMITING: 0
SINUS PRESSURE: 0
ABDOMINAL DISTENTION: 0
FEVER: 0
RHINORRHEA: 0
ABDOMINAL PAIN: 0

## 2025-08-19 LAB
25(OH)D3+25(OH)D2 SERPL-MCNC: 62 NG/ML (ref 30–100)
ALBUMIN SERPL-MCNC: 4.7 G/DL (ref 3.6–5.1)
ALBUMIN/CREAT UR: 10 MG/G CREAT
ALP SERPL-CCNC: 108 U/L (ref 37–153)
ALT SERPL-CCNC: 19 U/L (ref 6–29)
ANION GAP SERPL CALCULATED.4IONS-SCNC: 9 MMOL/L (CALC) (ref 7–17)
AST SERPL-CCNC: 30 U/L (ref 10–35)
BASOPHILS # BLD AUTO: 30 CELLS/UL (ref 0–200)
BASOPHILS NFR BLD AUTO: 0.9 %
BILIRUB SERPL-MCNC: 0.8 MG/DL (ref 0.2–1.2)
BUN SERPL-MCNC: 18 MG/DL (ref 7–25)
CALCIUM SERPL-MCNC: 10.2 MG/DL (ref 8.6–10.4)
CHLORIDE SERPL-SCNC: 100 MMOL/L (ref 98–110)
CHOLEST SERPL-MCNC: 196 MG/DL
CHOLEST/HDLC SERPL: 2.6 (CALC)
CK SERPL-CCNC: 73 U/L (ref 16–215)
CO2 SERPL-SCNC: 31 MMOL/L (ref 20–32)
CREAT SERPL-MCNC: 0.86 MG/DL (ref 0.6–0.95)
CREAT UR-MCNC: 116 MG/DL (ref 20–275)
EGFRCR SERPLBLD CKD-EPI 2021: 67 ML/MIN/1.73M2
EOSINOPHIL # BLD AUTO: 69 CELLS/UL (ref 15–500)
EOSINOPHIL NFR BLD AUTO: 2.1 %
ERYTHROCYTE [DISTWIDTH] IN BLOOD BY AUTOMATED COUNT: 13.6 % (ref 11–15)
GLUCOSE SERPL-MCNC: 83 MG/DL (ref 65–99)
HCT VFR BLD AUTO: 44.3 % (ref 35–45)
HDLC SERPL-MCNC: 75 MG/DL
HGB BLD-MCNC: 14.5 G/DL (ref 11.7–15.5)
LDLC SERPL CALC-MCNC: 104 MG/DL (CALC)
LYMPHOCYTES # BLD AUTO: 1119 CELLS/UL (ref 850–3900)
LYMPHOCYTES NFR BLD AUTO: 33.9 %
MCH RBC QN AUTO: 31.9 PG (ref 27–33)
MCHC RBC AUTO-ENTMCNC: 32.7 G/DL (ref 32–36)
MCV RBC AUTO: 97.6 FL (ref 80–100)
MICROALBUMIN UR-MCNC: 1.2 MG/DL
MONOCYTES # BLD AUTO: 380 CELLS/UL (ref 200–950)
MONOCYTES NFR BLD AUTO: 11.5 %
NEUTROPHILS # BLD AUTO: 1703 CELLS/UL (ref 1500–7800)
NEUTROPHILS NFR BLD AUTO: 51.6 %
NONHDLC SERPL-MCNC: 121 MG/DL (CALC)
PLATELET # BLD AUTO: 266 THOUSAND/UL (ref 140–400)
PMV BLD REES-ECKER: 10.4 FL (ref 7.5–12.5)
POTASSIUM SERPL-SCNC: 4.8 MMOL/L (ref 3.5–5.3)
PROT SERPL-MCNC: 7.6 G/DL (ref 6.1–8.1)
RBC # BLD AUTO: 4.54 MILLION/UL (ref 3.8–5.1)
SODIUM SERPL-SCNC: 140 MMOL/L (ref 135–146)
TRIGL SERPL-MCNC: 82 MG/DL
TSH SERPL-ACNC: 1.9 MIU/L (ref 0.4–4.5)
WBC # BLD AUTO: 3.3 THOUSAND/UL (ref 3.8–10.8)

## 2025-09-05 ENCOUNTER — APPOINTMENT (OUTPATIENT)
Dept: RADIOLOGY | Facility: CLINIC | Age: 85
End: 2025-09-05
Payer: MEDICARE

## 2025-09-15 ENCOUNTER — APPOINTMENT (OUTPATIENT)
Dept: RADIOLOGY | Facility: CLINIC | Age: 85
End: 2025-09-15
Payer: MEDICARE

## 2025-09-16 ENCOUNTER — APPOINTMENT (OUTPATIENT)
Dept: OTOLARYNGOLOGY | Facility: CLINIC | Age: 85
End: 2025-09-16
Payer: MEDICARE

## 2026-08-26 ENCOUNTER — APPOINTMENT (OUTPATIENT)
Dept: PRIMARY CARE | Facility: CLINIC | Age: 86
End: 2026-08-26
Payer: MEDICARE